# Patient Record
Sex: MALE | Race: WHITE | NOT HISPANIC OR LATINO | Employment: OTHER | URBAN - NONMETROPOLITAN AREA
[De-identification: names, ages, dates, MRNs, and addresses within clinical notes are randomized per-mention and may not be internally consistent; named-entity substitution may affect disease eponyms.]

---

## 2017-10-18 ENCOUNTER — TRANSCRIBE ORDERS (OUTPATIENT)
Dept: PODIATRY | Facility: CLINIC | Age: 52
End: 2017-10-18

## 2017-10-18 DIAGNOSIS — M79.673 PAIN OF FOOT, UNSPECIFIED LATERALITY: Primary | ICD-10-CM

## 2017-10-19 ENCOUNTER — OFFICE VISIT (OUTPATIENT)
Dept: PODIATRY | Facility: CLINIC | Age: 52
End: 2017-10-19

## 2017-10-19 VITALS
BODY MASS INDEX: 39.4 KG/M2 | HEIGHT: 68 IN | HEART RATE: 102 BPM | OXYGEN SATURATION: 96 % | SYSTOLIC BLOOD PRESSURE: 135 MMHG | WEIGHT: 260 LBS | DIASTOLIC BLOOD PRESSURE: 96 MMHG

## 2017-10-19 DIAGNOSIS — E11.8 DIABETIC FOOT (HCC): Primary | ICD-10-CM

## 2017-10-19 DIAGNOSIS — L85.1 ACQUIRED KERATODERMA: ICD-10-CM

## 2017-10-19 DIAGNOSIS — M79.672 PAIN IN BOTH FEET: ICD-10-CM

## 2017-10-19 DIAGNOSIS — M79.671 PAIN IN BOTH FEET: ICD-10-CM

## 2017-10-19 DIAGNOSIS — M19.079 ARTHRITIS OF MIDFOOT: ICD-10-CM

## 2017-10-19 PROCEDURE — 99203 OFFICE O/P NEW LOW 30 MIN: CPT | Performed by: PODIATRIST

## 2017-10-19 PROCEDURE — 11055 PARING/CUTG B9 HYPRKER LES 1: CPT | Performed by: PODIATRIST

## 2017-10-19 RX ORDER — OMEPRAZOLE 20 MG/1
20 CAPSULE, DELAYED RELEASE ORAL NIGHTLY
COMMUNITY

## 2017-10-19 RX ORDER — FOLIC ACID 1 MG/1
1 TABLET ORAL NIGHTLY
COMMUNITY

## 2017-10-19 RX ORDER — CHOLECALCIFEROL (VITAMIN D3) 125 MCG
CAPSULE ORAL NIGHTLY
COMMUNITY

## 2017-10-19 RX ORDER — INSULIN GLARGINE 100 [IU]/ML
90 INJECTION, SOLUTION SUBCUTANEOUS NIGHTLY
COMMUNITY

## 2017-10-19 RX ORDER — GLIPIZIDE 10 MG/1
10 TABLET ORAL NIGHTLY
COMMUNITY

## 2017-10-19 RX ORDER — ATORVASTATIN CALCIUM 40 MG/1
40 TABLET, FILM COATED ORAL NIGHTLY
COMMUNITY
End: 2022-11-06

## 2017-10-19 NOTE — PROGRESS NOTES
Rubén Maki  1965  52 y.o. male  PCP- Dr. House  BS-110 per patient  Patient presents today for diabetic foot exam.    10/19/2017  Chief Complaint   Patient presents with   • Right Foot - diabetic foot exam   • Left Foot - diabetic foot exam           History of Present Illness    Rubén Maki is a 52 y.o. male who presents for diabetic foot examination and complaint of chronic bilateral mild foot pain.  He states he does occasionally get pain on the tops of his feet and near his little toe on the bottom of his right foot.  He describes pain as achy and worse with weightbearing.  He takes glipizide for his diabetes.  He denies significant numbness, tingling and burning sensations.  He denies any acute pedal complaints today.      Past Medical History:   Diagnosis Date   • Diabetes mellitus    • Hypertension          Past Surgical History:   Procedure Laterality Date   • GALLBLADDER SURGERY     • GASTRIC BYPASS     • SHOULDER SURGERY     • SKIN GRAFT           Family History   Problem Relation Age of Onset   • Hypertension Mother    • Heart disease Father    • Hypertension Father    • Cancer Brother    • Cancer Paternal Aunt    • Diabetes Paternal Aunt    • Cancer Paternal Grandmother    • Heart disease Paternal Grandfather    • No Known Problems Sister    • No Known Problems Sister    • Autism Son          Social History     Social History   • Marital status:      Spouse name: N/A   • Number of children: N/A   • Years of education: N/A     Occupational History   • Not on file.     Social History Main Topics   • Smoking status: Never Smoker   • Smokeless tobacco: Former User     Quit date: 1/1/2000   • Alcohol use 3.6 oz/week     6 Cans of beer per week   • Drug use: No   • Sexual activity: Defer     Other Topics Concern   • Not on file     Social History Narrative   • No narrative on file         Current Outpatient Prescriptions   Medication Sig Dispense Refill   • atorvastatin (LIPITOR)  "40 MG tablet Take 40 mg by mouth Daily.     • Cholecalciferol (VITAMIN D3) 2000 units tablet Take  by mouth Daily.     • folic acid (FOLVITE) 1 MG tablet Take 1 mg by mouth Daily.     • glipiZIDE (GLUCOTROL) 5 MG tablet Take 5 mg by mouth Daily.     • insulin glargine (LANTUS) 100 UNIT/ML injection Inject 90 Units under the skin Daily.     • omeprazole (priLOSEC) 20 MG capsule Take 20 mg by mouth Daily.       No current facility-administered medications for this visit.          OBJECTIVE    /96  Pulse 102  Ht 68\" (172.7 cm)  Wt 260 lb (118 kg)  SpO2 96%  BMI 39.53 kg/m2      Review of Systems   Constitutional:  Denies recent weight loss, weight gain, fever or chills, no change in exercise tolerance  Musculoskeletal: Toe/foot pain.   Skin: No wounds or lesions  Neurological: Denies paresthesias.  Psychiatric/Behavioral: Denies depression        Physical Exam   Constitutional: he appears well-developed and well-nourished.   HEENT: Normocephalic. Atraumatic  CV: No tenderness. RRR  Resp: Non-labored respiration. No wheezes.   Psychiatric: he has a normal mood and affect. his   behavior is normal.      Lower Extremity Exam:  Vascular: DP/PT pulses palpable 1+.   Negative hair growth.   No edema  Toes warm, CFT wnl  Neuro: Protective sensation intact, b/l.  DTRs intact  Integument: No open wounds or lesions.  Keratosis sub-fourth MTPJ right  Skin quality normal  No masses  Musculoskeletal: LE muscle strength 5/5.   Gait normal  Mild degenerative changes to dorsal midfoot bilateral with palpable exostosis  Ankle range of motion within normal limits          ASSESSMENT AND PLAN    Rubén was seen today for diabetic foot exam and diabetic foot exam.    Diagnoses and all orders for this visit:    Diabetic foot    Acquired keratoderma    Arthritis of midfoot    Pain in both feet    -Comprehensive DM foot exam performed. Pt educated on importance of tight glucose control and daily foot checks. Pt education materials " dispensed.  -Pt educated on proper extra depth diabetic shoe gear.  Limit barefoot walking.  -Above noted hyperkeratosis debrided with 15 blade to epidermis without incident  -Follow up 6 months PRN            This document has been electronically signed by Suraj Nichols DPM on October 30, 2017 7:22 PM     EMR Dragon/Transcription disclaimer:   Much of this encounter note is an electronic transcription/translation of spoken language to printed text. The electronic translation of spoken language may permit erroneous, or at times, nonsensical words or phrases to be inadvertently transcribed; Although I have reviewed the note for such errors, some may still exist.    Suraj Nichols DPM  10/30/2017  7:22 PM

## 2018-08-06 ENCOUNTER — OFFICE VISIT (OUTPATIENT)
Dept: GASTROENTEROLOGY | Facility: CLINIC | Age: 53
End: 2018-08-06

## 2018-08-06 VITALS
DIASTOLIC BLOOD PRESSURE: 84 MMHG | WEIGHT: 245.2 LBS | HEIGHT: 68 IN | BODY MASS INDEX: 37.16 KG/M2 | HEART RATE: 110 BPM | SYSTOLIC BLOOD PRESSURE: 130 MMHG

## 2018-08-06 DIAGNOSIS — R74.8 ELEVATED LIVER ENZYMES: Primary | ICD-10-CM

## 2018-08-06 DIAGNOSIS — R17 ELEVATED BILIRUBIN: ICD-10-CM

## 2018-08-06 DIAGNOSIS — R10.84 GENERALIZED ABDOMINAL PAIN: ICD-10-CM

## 2018-08-06 PROCEDURE — 99203 OFFICE O/P NEW LOW 30 MIN: CPT | Performed by: PHYSICIAN ASSISTANT
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
## 2018-08-06 NOTE — PATIENT INSTRUCTIONS

## 2018-08-08 ENCOUNTER — APPOINTMENT (OUTPATIENT)
Dept: LAB | Facility: HOSPITAL | Age: 53
End: 2018-08-08

## 2018-08-08 LAB
ALBUMIN SERPL-MCNC: 4.4 G/DL (ref 3.4–4.8)
ALP SERPL-CCNC: 138 U/L (ref 38–126)
ALT SERPL W P-5'-P-CCNC: 50 U/L (ref 21–72)
AST SERPL-CCNC: 48 U/L (ref 17–59)
BILIRUB CONJ SERPL-MCNC: 0 MG/DL (ref 0–0.3)
BILIRUB INDIRECT SERPL-MCNC: 2 MG/DL (ref 0–1.1)
BILIRUB SERPL-MCNC: 2.8 MG/DL (ref 0.2–1.3)
FERRITIN SERPL-MCNC: 47.1 NG/ML (ref 17.9–464)
HAV IGM SERPL QL IA: NEGATIVE
HBV CORE IGM SERPL QL IA: NEGATIVE
HBV SURFACE AG SERPL QL IA: NEGATIVE
HCV AB SER DONR QL: NEGATIVE
INR PPP: 1.05 (ref 0.8–1.2)
IRON 24H UR-MRATE: 77 MCG/DL (ref 49–181)
IRON SATN MFR SERPL: 21 % (ref 20–55)
PROT SERPL-MCNC: 7.5 G/DL (ref 6.3–8.6)
PROTHROMBIN TIME: 13.5 SECONDS (ref 11.1–15.3)
TIBC SERPL-MCNC: 374 MCG/DL (ref 261–462)

## 2018-08-08 PROCEDURE — 82105 ALPHA-FETOPROTEIN SERUM: CPT | Performed by: PHYSICIAN ASSISTANT

## 2018-08-08 PROCEDURE — 80076 HEPATIC FUNCTION PANEL: CPT | Performed by: PHYSICIAN ASSISTANT

## 2018-08-08 PROCEDURE — 86038 ANTINUCLEAR ANTIBODIES: CPT | Performed by: PHYSICIAN ASSISTANT

## 2018-08-08 PROCEDURE — 84450 TRANSFERASE (AST) (SGOT): CPT | Performed by: PHYSICIAN ASSISTANT

## 2018-08-08 PROCEDURE — 82728 ASSAY OF FERRITIN: CPT | Performed by: PHYSICIAN ASSISTANT

## 2018-08-08 PROCEDURE — 84460 ALANINE AMINO (ALT) (SGPT): CPT | Performed by: PHYSICIAN ASSISTANT

## 2018-08-08 PROCEDURE — 82103 ALPHA-1-ANTITRYPSIN TOTAL: CPT | Performed by: PHYSICIAN ASSISTANT

## 2018-08-08 PROCEDURE — 85610 PROTHROMBIN TIME: CPT | Performed by: PHYSICIAN ASSISTANT

## 2018-08-08 PROCEDURE — 84478 ASSAY OF TRIGLYCERIDES: CPT | Performed by: PHYSICIAN ASSISTANT

## 2018-08-08 PROCEDURE — 82947 ASSAY GLUCOSE BLOOD QUANT: CPT | Performed by: PHYSICIAN ASSISTANT

## 2018-08-08 PROCEDURE — 83883 ASSAY NEPHELOMETRY NOT SPEC: CPT | Performed by: PHYSICIAN ASSISTANT

## 2018-08-08 PROCEDURE — 82977 ASSAY OF GGT: CPT | Performed by: PHYSICIAN ASSISTANT

## 2018-08-08 PROCEDURE — 83516 IMMUNOASSAY NONANTIBODY: CPT | Performed by: PHYSICIAN ASSISTANT

## 2018-08-08 PROCEDURE — 82390 ASSAY OF CERULOPLASMIN: CPT | Performed by: PHYSICIAN ASSISTANT

## 2018-08-08 PROCEDURE — 83550 IRON BINDING TEST: CPT | Performed by: PHYSICIAN ASSISTANT

## 2018-08-08 PROCEDURE — 83540 ASSAY OF IRON: CPT | Performed by: PHYSICIAN ASSISTANT

## 2018-08-08 PROCEDURE — 82172 ASSAY OF APOLIPOPROTEIN: CPT | Performed by: PHYSICIAN ASSISTANT

## 2018-08-08 PROCEDURE — 80074 ACUTE HEPATITIS PANEL: CPT | Performed by: PHYSICIAN ASSISTANT

## 2018-08-08 PROCEDURE — 83010 ASSAY OF HAPTOGLOBIN QUANT: CPT | Performed by: PHYSICIAN ASSISTANT

## 2018-08-08 PROCEDURE — 36415 COLL VENOUS BLD VENIPUNCTURE: CPT | Performed by: PHYSICIAN ASSISTANT

## 2018-08-08 PROCEDURE — 82465 ASSAY BLD/SERUM CHOLESTEROL: CPT | Performed by: PHYSICIAN ASSISTANT

## 2018-08-08 PROCEDURE — 82247 BILIRUBIN TOTAL: CPT | Performed by: PHYSICIAN ASSISTANT

## 2018-08-09 LAB
A1AT SERPL-MCNC: 120 MG/DL (ref 90–200)
ACTIN IGG SERPL-ACNC: 3 UNITS (ref 0–19)
AFP-TM SERPL-MCNC: 4.9 NG/ML (ref 0–8.3)
ANA SER QL IA: NEGATIVE
CERULOPLASMIN SERPL-MCNC: 26.3 MG/DL (ref 16–31)
DEPRECATED MITOCHONDRIA M2 IGG SER-ACNC: <20 UNITS (ref 0–20)
HAPTOGLOB SERPL-MCNC: 121 MG/DL (ref 34–200)

## 2018-08-11 LAB
A2 MACROGLOB SERPL-MCNC: 280 MG/DL (ref 110–276)
ALT SERPL W P-5'-P-CCNC: 38 IU/L (ref 0–55)
APO A-I SERPL-MCNC: 144 MG/DL (ref 101–178)
AST SERPL W P-5'-P-CCNC: 35 IU/L (ref 0–40)
BILIRUB SERPL-MCNC: 2.1 MG/DL (ref 0–1.2)
CHOLEST SERPL-MCNC: 111 MG/DL (ref 100–199)
FIBROSIS SCORING:: ABNORMAL
FIBROSIS STAGE SERPL QL: ABNORMAL
GGT SERPL-CCNC: 45 IU/L (ref 0–65)
GLUCOSE SERPL-MCNC: 381 MG/DL (ref 65–99)
HAPTOGLOB SERPL-MCNC: 126 MG/DL (ref 34–200)
INTERPRETATIONS: (REFERENCE): ABNORMAL
LABORATORY COMMENT REPORT: ABNORMAL
LIMITATIONS: (REFERENCE): ABNORMAL
LIVER FIBR SCORE SERPL CALC.FIBROSURE: 0.68 (ref 0–0.21)
NASH GRADE (REFERENCE): ABNORMAL
NASH SCORE (REFERENCE): 0.75
NASH SCORING (REFERENCE): ABNORMAL
STEATOSIS GRADE (REFERENCE): ABNORMAL
STEATOSIS GRADING (REFERENCE): ABNORMAL
STEATOSIS SCORE (REFERENCE): 0.72 (ref 0–0.3)
TRIGL SERPL-MCNC: 233 MG/DL (ref 0–149)
WEIGHT: (REFERENCE): 245 LBS

## 2018-08-20 ENCOUNTER — OFFICE VISIT (OUTPATIENT)
Dept: GASTROENTEROLOGY | Facility: CLINIC | Age: 53
End: 2018-08-20

## 2018-08-20 VITALS
SYSTOLIC BLOOD PRESSURE: 132 MMHG | WEIGHT: 241.4 LBS | DIASTOLIC BLOOD PRESSURE: 84 MMHG | HEIGHT: 68 IN | BODY MASS INDEX: 36.59 KG/M2 | HEART RATE: 88 BPM

## 2018-08-20 DIAGNOSIS — R17 ELEVATED BILIRUBIN: ICD-10-CM

## 2018-08-20 DIAGNOSIS — R74.8 ELEVATED LIVER ENZYMES: Primary | ICD-10-CM

## 2018-08-20 DIAGNOSIS — K75.81 NASH (NONALCOHOLIC STEATOHEPATITIS): ICD-10-CM

## 2018-08-20 DIAGNOSIS — K21.00 GASTROESOPHAGEAL REFLUX DISEASE WITH ESOPHAGITIS: ICD-10-CM

## 2018-08-20 PROCEDURE — 99213 OFFICE O/P EST LOW 20 MIN: CPT | Performed by: PHYSICIAN ASSISTANT

## 2018-08-20 NOTE — PROGRESS NOTES
Chief Complaint   Patient presents with   • Elevated Hepatic Enzymes   • elevated bilirubin       ENDO PROCEDURE ORDERED:    Subjective    Rubén Maki is a 52 y.o. male. he is here today for follow-up.    History of Present Illness    Patient seen on a recheck of his elevated liver enzymes, elevated bilirubin, gallstones. Last seen 08/06/2018. Patient currently denies abdominal pain, he is on Prilosec 20 mg daily for chronic heartburn, denied nausea, vomiting, dysphagia, bowels are moving without blood or mucus. Weight is down 4 pounds since last visit. [facility] had [facility] but has never had a colonoscopy.    Studies done on 08/08/2018 LFT's showed an elevated alkaline phosphatase 138 with a total bilirubin 2.8, mostly indirect of 2.0. INR 1.05. Haptoglobin was normal, hepatic diagnostic panel was negative. RONDA, AMA, SMA, ceruloplasmin, Alpha-1 antitrypsin, AFP, iron studies were normal or negative. LINDSEY FibroSure 0.68/F3, steatosis 0.72/S3, 0.75/N2. Total bilirubin 2.1, glucose 381, triglycerides 233.     A/P: Patient's GERD is well controlled on the Prilosec. He appears to be asymptomatic of his gallstones at present. Significant steatohepatitis with fibrosis. Elevated bilirubin presumed secondary to the above. I did recommend aggressive treatment of his diabetes and cholesterol. I strongly encouraged continued dietary modification and weight loss. He is encouraged to follow-up with Dr. House to try to get more tight control of his diabetes. Would recommend repeat imaging of his liver in June. Will plan follow-up in 4 months with repeat laboratories prior to that. Further pending clinical course and the results of the above.                    The following portions of the patient's history were reviewed and updated as appropriate:   Past Medical History:   Diagnosis Date   • Diabetes mellitus (CMS/HCC)    • Gall stones    • Hypertension      Past Surgical  "History:   Procedure Laterality Date   • GALLBLADDER SURGERY     • GASTRIC BYPASS     • SHOULDER SURGERY     • SKIN GRAFT       Family History   Problem Relation Age of Onset   • Hypertension Mother    • Heart disease Father    • Hypertension Father    • Cancer Brother    • Cancer Paternal Aunt    • Diabetes Paternal Aunt    • Cancer Paternal Grandmother    • Heart disease Paternal Grandfather    • No Known Problems Sister    • No Known Problems Sister    • Autism Son        No Known Allergies  Social History     Social History   • Marital status:      Social History Main Topics   • Smoking status: Never Smoker   • Smokeless tobacco: Former User     Quit date: 1/1/2000   • Alcohol use 3.6 oz/week     6 Cans of beer per week   • Drug use: No   • Sexual activity: Defer     Other Topics Concern   • Not on file       Current Outpatient Prescriptions:   •  atorvastatin (LIPITOR) 40 MG tablet, Take 40 mg by mouth Daily., Disp: , Rfl:   •  Cholecalciferol (VITAMIN D3) 2000 units tablet, Take  by mouth Daily., Disp: , Rfl:   •  folic acid (FOLVITE) 1 MG tablet, Take 1 mg by mouth Daily., Disp: , Rfl:   •  glipiZIDE (GLUCOTROL) 5 MG tablet, Take 5 mg by mouth Daily., Disp: , Rfl:   •  insulin glargine (LANTUS) 100 UNIT/ML injection, Inject 90 Units under the skin Daily., Disp: , Rfl:   •  omeprazole (priLOSEC) 20 MG capsule, Take 20 mg by mouth Daily., Disp: , Rfl:   Review of Systems  Review of Systems       Objective    /84 (BP Location: Right arm, Patient Position: Sitting)   Pulse 88   Ht 172.7 cm (68\")   Wt 109 kg (241 lb 6.4 oz)   BMI 36.70 kg/m²   Physical Exam   Constitutional: He is oriented to person, place, and time. He appears well-developed and well-nourished. No distress.   HENT:   Head: Normocephalic and atraumatic.   Eyes: Pupils are equal, round, and reactive to light. EOM are normal.   Neck: Normal range of motion.   Cardiovascular: Normal rate, regular rhythm and normal heart sounds.  "   Pulmonary/Chest: Effort normal and breath sounds normal.   Abdominal: Soft. Bowel sounds are normal. He exhibits no shifting dullness, no distension, no abdominal bruit, no ascites and no mass. There is no hepatosplenomegaly. There is tenderness. There is no rigidity, no rebound, no guarding and no CVA tenderness. No hernia. Hernia confirmed negative in the ventral area.   Mild, obese, diffuse   Musculoskeletal: Normal range of motion.   Neurological: He is alert and oriented to person, place, and time.   Skin: Skin is warm and dry.   Psychiatric: He has a normal mood and affect. His behavior is normal. Judgment and thought content normal.   Nursing note and vitals reviewed.    Assessment/Plan      1. Elevated liver enzymes    2. LINDSEY (nonalcoholic steatohepatitis)    3. Elevated bilirubin    4. Gastroesophageal reflux disease with esophagitis    .   Rubén was seen today for elevated hepatic enzymes and elevated bilirubin.    Diagnoses and all orders for this visit:    Elevated liver enzymes  -     Comprehensive Metabolic Panel; Future  -     Protime-INR; Future    LINDSEY (nonalcoholic steatohepatitis)  Comments:  F3/S3/N2  Orders:  -     Comprehensive Metabolic Panel; Future  -     Protime-INR; Future    Elevated bilirubin  -     Comprehensive Metabolic Panel; Future  -     Protime-INR; Future    Gastroesophageal reflux disease with esophagitis  -     Comprehensive Metabolic Panel; Future  -     Protime-INR; Future        Orders placed during this encounter include:  Orders Placed This Encounter   Procedures   • Comprehensive Metabolic Panel     Standing Status:   Future     Standing Expiration Date:   2/16/2019   • Protime-INR     Standing Status:   Future     Standing Expiration Date:   2/16/2019       Medications prescribed:  No orders of the defined types were placed in this encounter.      Requested Prescriptions      No prescriptions requested or ordered in this encounter       Review and/or summary of lab  tests, radiology, procedures, medications. Review and summary of old records and obtaining of history. The risks and benefits of my recommendations, as well as other treatment options were discussed with the patient today. Questions were answered.    Follow-up: Return in about 4 months (around 12/20/2018), or if symptoms worsen or fail to improve, for lab prior.     * Surgery not found *      This document has been electronically signed by Ivan Nichols PA-C on August 21, 2018 7:09 PM      Results for orders placed or performed in visit on 08/06/18   LINDSEY Fibrosure   Result Value Ref Range    Fibrosis Score (References) 0.68 (H) 0.00 - 0.21    Fibrosis Stage (Reference) Comment     Steatosis Score (Reference) 0.72 (H) 0.00 - 0.30    Steatosis Grade (Reference) Comment     LINDSEY Score (Reference) 0.75 0.25    Lindsey Grade (Reference) Comment     Height: (Reference) 68 in    Weight: (Reference) 245 LBS    Alpha 2-Macroglobulins, Qn 280 (H) 110 - 276 mg/dL    Haptoglobin 126 34 - 200 mg/dL    Apolipoprotein A-1 144 101 - 178 mg/dL    Total Bilirubin 2.1 (H) 0.0 - 1.2 mg/dL    GGT 45 0 - 65 IU/L    ALT (SGPT) 38 0 - 55 IU/L    AST (SGOT) P5P (Reference) 35 0 - 40 IU/L    Cholesterol, Total (Reference) 111 100 - 199 mg/dL    Glucose, Serum (Reference) 381 (H) 65 - 99 mg/dL    Triglycerides 233 (H) 0 - 149 mg/dL    Interpretations: (Reference) Comment     Fibrosis Scoring: Comment     Steatosis Grading (Reference) Comment     Lindsey Scoring (Reference) Comment     Limitations: (Reference) Comment     Comment (Reference) Comment    Nuclear Antigen Antibody, IFA   Result Value Ref Range    RONDA Negative    Iron and TIBC   Result Value Ref Range    Iron 77 49 - 181 mcg/dL    TIBC 374 261 - 462 mcg/dL    Iron Saturation 21 20 - 55 %   Alpha - 1 - Antitrypsin   Result Value Ref Range    A-1 Antitrypsin 120 90 - 200 mg/dL   Mitochondrial Antibodies, M2   Result Value Ref Range    Mitochondrial Ab <20.0 0.0 - 20.0 Units    Ceruloplasmin   Result Value Ref Range    Ceruloplasmin 26.3 16.0 - 31.0 mg/dL   AFP Tumor Marker   Result Value Ref Range    AFP Tumor Marker 4.9 0.0 - 8.3 ng/mL   Hepatitis Panel, Acute   Result Value Ref Range    Hepatitis C Ab Negative Negative    Hep A IgM Negative Negative    Hep B C IgM Negative Negative    Hepatitis B Surface Ag Negative Negative   Anti-Smooth Muscle Antibody Titer   Result Value Ref Range    Smooth Muscle Ab 3 0 - 19 Units   Protime-INR   Result Value Ref Range    Protime 13.5 11.1 - 15.3 Seconds    INR 1.05 0.80 - 1.20   Haptoglobin   Result Value Ref Range    Haptoglobin 121 34 - 200 mg/dL   Ferritin   Result Value Ref Range    Ferritin 47.10 17.90 - 464.00 ng/mL   Hepatic Function Panel   Result Value Ref Range    Total Protein 7.5 6.3 - 8.6 g/dL    Albumin 4.40 3.40 - 4.80 g/dL    ALT (SGPT) 50 21 - 72 U/L    AST (SGOT) 48 17 - 59 U/L    Alkaline Phosphatase 138 (H) 38 - 126 U/L    Total Bilirubin 2.8 (H) 0.2 - 1.3 mg/dL    Bilirubin, Direct 0.0 0.0 - 0.3 mg/dL    Bilirubin, Indirect 2.0 (H) 0.0 - 1.1 mg/dL   Results for orders placed or performed during the hospital encounter of 08/13/15   Manual Differential   Result Value Ref Range    Neutrophil Rel % 66 37 - 80 %    Lymphocyte Rel % 21 10 - 50 %    Monocyte Rel % 5 0 - 12 %    Eosinophil Rel % 3 0 - 7 %    Basophil Rel % 2 0 - 2 %    Atypical Lymphocytes Rel % 3 (H) 0 - 0 %    RBC Morphology Normocytic Normochromic     Platelet Estimate Decreased     Total Counted 100    CBC and Differential   Result Value Ref Range    WBC 9.1 3.2 - 9.8 x1000/uL    RBC 4.93 4.37 - 5.74 liane/mm3    Hemoglobin 15.8 13.7 - 17.3 gm/dl    Hematocrit 43.1 39.0 - 49.0 %    MCV 87.4 80.0 - 98.0 fl    MCH 32.0 26.0 - 34.0 pg    MCHC 36.7 (H) 31.5 - 36.3 gm/dl    RDW 12.5 11.5 - 14.5 %    Platelets 66 (L) 150 - 450 x1000/mm3    MPV 12.5 (H) 8.0 - 12.0 fl    Neutrophil Rel % 60.4 37.0 - 80.0 %    Lymphocyte Rel % 28.8 10.0 - 50.0 %    Monocyte Rel % 7.8 0.0  - 12.0 %    Eosinophil Rel % 1.9 0.0 - 7.0 %    Basophil Rel % 0.8 0.0 - 2.0 %    Immature Granulocyte Rel % 0.30 0.00 - 0.50 %    Neutrophils Absolute 5.51 2.00 - 8.60 x1000/uL    Lymphocytes Absolute 2.62 0.60 - 4.20 x1000/uL    Monocytes Absolute 0.71 0.00 - 0.90 x1000/uL    Eosinophils Absolute 0.17 0.00 - 0.70 x1000/uL    Basophils Absolute 0.07 0.00 - 0.20 x1000/uL    Immature Granulocytes Absolute 0.030 (H) 0.005 - 0.022 x1000/uL    nRBC 0.0 0.0 - 0.2 %    nRBC 0.000 x1000/uL   Results for orders placed or performed during the hospital encounter of 02/01/15   Manual Differential   Result Value Ref Range    Neutrophil Rel % 76 37 - 80 %    Lymphocyte Rel % 11 10 - 50 %    Monocyte Rel % 6 0 - 12 %    Eosinophil Rel % 2 0 - 7 %    Atypical Lymphocytes Rel % 5 (H) 0 - 0 %    RBC Morphology Normocytic Normochromic     Platelet Estimate Decreased     Total Counted 100    CBC and Differential   Result Value Ref Range    WBC 12.6 (H) 3.2 - 9.8 x1000/uL    RBC 4.96 4.37 - 5.74 liane/mm3    Hemoglobin 15.9 13.7 - 17.3 gm/dl    Hematocrit 43.0 39.0 - 49.0 %    MCV 86.7 80.0 - 98.0 fl    MCH 32.1 26.0 - 34.0 pg    MCHC 37.0 (H) 31.5 - 36.3 gm/dl    RDW 12.5 11.5 - 14.5 %    Platelets 103 (L) 150 - 450 x1000/mm3    MPV 13.6 (H) 8.0 - 12.0 fl    Neutrophil Rel % 76.3 37.0 - 80.0 %    Lymphocyte Rel % 16.0 10.0 - 50.0 %    Monocyte Rel % 6.2 0.0 - 12.0 %    Eosinophil Rel % 0.8 0.0 - 7.0 %    Basophil Rel % 0.4 0.0 - 2.0 %    Immature Granulocyte Rel % 0.30 0.00 - 0.50 %    Neutrophils Absolute 9.65 (H) 2.00 - 8.60 x1000/uL    Lymphocytes Absolute 2.02 0.60 - 4.20 x1000/uL    Monocytes Absolute 0.78 0.00 - 0.90 x1000/uL    Eosinophils Absolute 0.10 0.00 - 0.70 x1000/uL    Basophils Absolute 0.05 0.00 - 0.20 x1000/uL    Immature Granulocytes Absolute 0.040 (H) 0.005 - 0.022 x1000/uL    nRBC 0.0 0.0 - 0.2 %    nRBC 0.000 x1000/uL     *Note: Due to a large number of results and/or encounters for the requested time period, some  results have not been displayed. A complete set of results can be found in Results Review.       Some portions of this note have been dictated using voice recognition software and may contain errors and/or omissions.

## 2018-12-19 ENCOUNTER — LAB (OUTPATIENT)
Dept: LAB | Facility: HOSPITAL | Age: 53
End: 2018-12-19

## 2018-12-19 DIAGNOSIS — R74.8 ELEVATED LIVER ENZYMES: ICD-10-CM

## 2018-12-19 DIAGNOSIS — R17 ELEVATED BILIRUBIN: ICD-10-CM

## 2018-12-19 DIAGNOSIS — K75.81 NASH (NONALCOHOLIC STEATOHEPATITIS): ICD-10-CM

## 2018-12-19 DIAGNOSIS — K21.00 GASTROESOPHAGEAL REFLUX DISEASE WITH ESOPHAGITIS: ICD-10-CM

## 2018-12-19 LAB
ALBUMIN SERPL-MCNC: 4.4 G/DL (ref 3.4–4.8)
ALBUMIN/GLOB SERPL: 1.4 G/DL (ref 1.1–1.8)
ALP SERPL-CCNC: 90 U/L (ref 38–126)
ALT SERPL W P-5'-P-CCNC: 42 U/L (ref 21–72)
ANION GAP SERPL CALCULATED.3IONS-SCNC: 13 MMOL/L (ref 5–15)
AST SERPL-CCNC: 45 U/L (ref 17–59)
BILIRUB SERPL-MCNC: 1.5 MG/DL (ref 0.2–1.3)
BUN BLD-MCNC: 18 MG/DL (ref 7–21)
BUN/CREAT SERPL: 16.1 (ref 7–25)
CALCIUM SPEC-SCNC: 9.4 MG/DL (ref 8.4–10.2)
CHLORIDE SERPL-SCNC: 100 MMOL/L (ref 95–110)
CO2 SERPL-SCNC: 27 MMOL/L (ref 22–31)
CREAT BLD-MCNC: 1.12 MG/DL (ref 0.7–1.3)
GFR SERPL CREATININE-BSD FRML MDRD: 69 ML/MIN/1.73 (ref 56–130)
GLOBULIN UR ELPH-MCNC: 3.1 GM/DL (ref 2.3–3.5)
GLUCOSE BLD-MCNC: 166 MG/DL (ref 60–100)
INR PPP: 1.03 (ref 0.8–1.2)
POTASSIUM BLD-SCNC: 4.3 MMOL/L (ref 3.5–5.1)
PROT SERPL-MCNC: 7.5 G/DL (ref 6.3–8.6)
PROTHROMBIN TIME: 13.3 SECONDS (ref 11.1–15.3)
SODIUM BLD-SCNC: 140 MMOL/L (ref 137–145)

## 2018-12-19 PROCEDURE — 80053 COMPREHEN METABOLIC PANEL: CPT

## 2018-12-19 PROCEDURE — 85610 PROTHROMBIN TIME: CPT

## 2018-12-19 PROCEDURE — 36415 COLL VENOUS BLD VENIPUNCTURE: CPT

## 2018-12-20 ENCOUNTER — OFFICE VISIT (OUTPATIENT)
Dept: GASTROENTEROLOGY | Facility: CLINIC | Age: 53
End: 2018-12-20

## 2018-12-20 VITALS
BODY MASS INDEX: 39.98 KG/M2 | HEIGHT: 68 IN | DIASTOLIC BLOOD PRESSURE: 86 MMHG | WEIGHT: 263.8 LBS | HEART RATE: 96 BPM | SYSTOLIC BLOOD PRESSURE: 126 MMHG

## 2018-12-20 DIAGNOSIS — K75.81 NASH (NONALCOHOLIC STEATOHEPATITIS): Primary | ICD-10-CM

## 2018-12-20 DIAGNOSIS — K21.00 GASTROESOPHAGEAL REFLUX DISEASE WITH ESOPHAGITIS: ICD-10-CM

## 2018-12-20 DIAGNOSIS — R74.8 ELEVATED LIVER ENZYMES: ICD-10-CM

## 2018-12-20 PROCEDURE — 99213 OFFICE O/P EST LOW 20 MIN: CPT | Performed by: PHYSICIAN ASSISTANT

## 2018-12-20 NOTE — PROGRESS NOTES
Chief Complaint   Patient presents with   • Elevated Hepatic Enzymes   • Lindsey   • Elevated Bilirubin       ENDO PROCEDURE ORDERED:    Subjective    Rubén Maki is a 53 y.o. male. he is here today for follow-up.    History of Present Illness    Patient seen on a recheck of his GERD, elevated liver enzymes, LINDSEY, gallstones, F3/S3/N2. Last seen 08/20/2018. Patient is currently on Prilosec 20 mg daily for chronic heartburn. Denied nausea, vomiting, dysphasia. Bowels are moving without blood or mucus. Weight is up 22 pounds since last visit. He states he has been much less active. 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 He has never had a colonoscopy but [facility]    Laboratory on 12/19/2018: INR 1.03. CMP showed glucose 166, total bilirubin 1.5, otherwise normal.    ASSESSMENT AND PLAN: Patient with significant steatohepatitis and hepatic fibrosis secondary to a fatty liver, secondary to his diabetes. He is encouraged to continue dietary modification and significant weight loss. I encouraged him to be more active. 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 He does not appear symptomatic of his gallstones at this time. Would consider colonoscopy, consider hepatoma screening. Will plan followup in 6 months with LINDSEY FibroSure prior, further pending clinical course and the results of the above.       The following portions of the patient's history were reviewed and updated as appropriate:   Past Medical History:   Diagnosis Date   • Diabetes mellitus (CMS/HCC)    • Gall stones    • Hypertension      Past Surgical History:   Procedure Laterality Date   • GALLBLADDER SURGERY     • GASTRIC BYPASS     • SHOULDER SURGERY     • SKIN GRAFT       Family History   Problem Relation Age of Onset   • Hypertension Mother    • Heart disease Father    • Hypertension Father    • Cancer  "Brother    • Cancer Paternal Aunt    • Diabetes Paternal Aunt    • Cancer Paternal Grandmother    • Heart disease Paternal Grandfather    • No Known Problems Sister    • No Known Problems Sister    • Autism Son        No Known Allergies  Social History     Socioeconomic History   • Marital status:      Spouse name: Not on file   • Number of children: Not on file   • Years of education: Not on file   • Highest education level: Not on file   Tobacco Use   • Smoking status: Never Smoker   • Smokeless tobacco: Former User   Substance and Sexual Activity   • Alcohol use: Yes     Alcohol/week: 3.6 oz     Types: 6 Cans of beer per week   • Drug use: No   • Sexual activity: Defer       Current Outpatient Medications:   •  atorvastatin (LIPITOR) 40 MG tablet, Take 40 mg by mouth Daily., Disp: , Rfl:   •  Cholecalciferol (VITAMIN D3) 2000 units tablet, Take  by mouth Daily., Disp: , Rfl:   •  folic acid (FOLVITE) 1 MG tablet, Take 1 mg by mouth Daily., Disp: , Rfl:   •  glipiZIDE (GLUCOTROL) 5 MG tablet, Take 5 mg by mouth Daily., Disp: , Rfl:   •  insulin glargine (LANTUS) 100 UNIT/ML injection, Inject 90 Units under the skin Daily., Disp: , Rfl:   •  omeprazole (priLOSEC) 20 MG capsule, Take 20 mg by mouth Daily., Disp: , Rfl:   Review of Systems  Review of Systems       Objective    /86 (BP Location: Left arm)   Pulse 96   Ht 172.7 cm (68\")   Wt 120 kg (263 lb 12.8 oz)   BMI 40.11 kg/m²   Physical Exam   Constitutional: He is oriented to person, place, and time. He appears well-developed and well-nourished. No distress.   HENT:   Head: Normocephalic and atraumatic.   Eyes: EOM are normal. Pupils are equal, round, and reactive to light.   Neck: Normal range of motion.   Cardiovascular: Normal rate, regular rhythm and normal heart sounds.   Pulmonary/Chest: Effort normal and breath sounds normal.   Abdominal: Soft. Bowel sounds are normal. He exhibits no shifting dullness, no distension, no abdominal bruit, " no ascites and no mass. There is no hepatosplenomegaly. There is tenderness. There is no rigidity, no rebound, no guarding and no CVA tenderness. No hernia. Hernia confirmed negative in the ventral area.   Mild, obese, diffuse   Musculoskeletal: Normal range of motion.   Neurological: He is alert and oriented to person, place, and time.   Skin: Skin is warm and dry.   Psychiatric: He has a normal mood and affect. His behavior is normal. Judgment and thought content normal.   Nursing note and vitals reviewed.    Assessment/Plan      1. LINDSEY (nonalcoholic steatohepatitis)    2. Elevated liver enzymes    3. Gastroesophageal reflux disease with esophagitis    .   Rubén was seen today for elevated hepatic enzymes, lindsey and elevated bilirubin.    Diagnoses and all orders for this visit:    LINDSEY (nonalcoholic steatohepatitis)  -     LINDSEY Fibrosure; Future    Elevated liver enzymes  -     LINDSEY Fibrosure; Future    Gastroesophageal reflux disease with esophagitis  -     LINDSEY Fibrosure; Future        Orders placed during this encounter include:  Orders Placed This Encounter   Procedures   • LINDSEY Fibrosure     Standing Status:   Future     Standing Expiration Date:   12/20/2019       Medications prescribed:  No orders of the defined types were placed in this encounter.      Requested Prescriptions      No prescriptions requested or ordered in this encounter       Review and/or summary of lab tests, radiology, procedures, medications. Review and summary of old records and obtaining of history. The risks and benefits of my recommendations, as well as other treatment options were discussed with the patient today. Questions were answered.    Follow-up: Return in about 6 months (around 6/20/2019), or if symptoms worsen or fail to improve, for lab prior.     * Surgery not found *      This document has been electronically signed by Ivan Nichols PA-C on December 23, 2018 12:49 PM      Results for orders placed or performed in  visit on 12/19/18   Protime-INR   Result Value Ref Range    Protime 13.3 11.1 - 15.3 Seconds    INR 1.03 0.80 - 1.20   Comprehensive Metabolic Panel   Result Value Ref Range    Glucose 166 (H) 60 - 100 mg/dL    BUN 18 7 - 21 mg/dL    Creatinine 1.12 0.70 - 1.30 mg/dL    Sodium 140 137 - 145 mmol/L    Potassium 4.3 3.5 - 5.1 mmol/L    Chloride 100 95 - 110 mmol/L    CO2 27.0 22.0 - 31.0 mmol/L    Calcium 9.4 8.4 - 10.2 mg/dL    Total Protein 7.5 6.3 - 8.6 g/dL    Albumin 4.40 3.40 - 4.80 g/dL    ALT (SGPT) 42 21 - 72 U/L    AST (SGOT) 45 17 - 59 U/L    Alkaline Phosphatase 90 38 - 126 U/L    Total Bilirubin 1.5 (H) 0.2 - 1.3 mg/dL    eGFR Non  Amer 69 56 - 130 mL/min/1.73    Globulin 3.1 2.3 - 3.5 gm/dL    A/G Ratio 1.4 1.1 - 1.8 g/dL    BUN/Creatinine Ratio 16.1 7.0 - 25.0    Anion Gap 13.0 5.0 - 15.0 mmol/L   Results for orders placed or performed in visit on 08/06/18   LINDSEY Fibrosure   Result Value Ref Range    Fibrosis Score (References) 0.68 (H) 0.00 - 0.21    Fibrosis Stage (Reference) Comment     Steatosis Score (Reference) 0.72 (H) 0.00 - 0.30    Steatosis Grade (Reference) Comment     LINDSEY Score (Reference) 0.75 0.25    Lindsey Grade (Reference) Comment     Height: (Reference) 68 in    Weight: (Reference) 245 LBS    Alpha 2-Macroglobulins, Qn 280 (H) 110 - 276 mg/dL    Haptoglobin 126 34 - 200 mg/dL    Apolipoprotein A-1 144 101 - 178 mg/dL    Total Bilirubin 2.1 (H) 0.0 - 1.2 mg/dL    GGT 45 0 - 65 IU/L    ALT (SGPT) 38 0 - 55 IU/L    AST (SGOT) P5P (Reference) 35 0 - 40 IU/L    Cholesterol, Total (Reference) 111 100 - 199 mg/dL    Glucose, Serum (Reference) 381 (H) 65 - 99 mg/dL    Triglycerides 233 (H) 0 - 149 mg/dL    Interpretations: (Reference) Comment     Fibrosis Scoring: Comment     Steatosis Grading (Reference) Comment     Lindsey Scoring (Reference) Comment     Limitations: (Reference) Comment     Comment (Reference) Comment    Nuclear Antigen Antibody, IFA   Result Value Ref Range    RONDA  Negative    Iron and TIBC   Result Value Ref Range    Iron 77 49 - 181 mcg/dL    TIBC 374 261 - 462 mcg/dL    Iron Saturation 21 20 - 55 %   Alpha - 1 - Antitrypsin   Result Value Ref Range    A-1 Antitrypsin 120 90 - 200 mg/dL   Mitochondrial Antibodies, M2   Result Value Ref Range    Mitochondrial Ab <20.0 0.0 - 20.0 Units   Ceruloplasmin   Result Value Ref Range    Ceruloplasmin 26.3 16.0 - 31.0 mg/dL   AFP Tumor Marker   Result Value Ref Range    AFP Tumor Marker 4.9 0.0 - 8.3 ng/mL   Hepatitis Panel, Acute   Result Value Ref Range    Hepatitis C Ab Negative Negative    Hep A IgM Negative Negative    Hep B C IgM Negative Negative    Hepatitis B Surface Ag Negative Negative   Anti-Smooth Muscle Antibody Titer   Result Value Ref Range    Smooth Muscle Ab 3 0 - 19 Units   Protime-INR   Result Value Ref Range    Protime 13.5 11.1 - 15.3 Seconds    INR 1.05 0.80 - 1.20   Haptoglobin   Result Value Ref Range    Haptoglobin 121 34 - 200 mg/dL   Ferritin   Result Value Ref Range    Ferritin 47.10 17.90 - 464.00 ng/mL   Hepatic Function Panel   Result Value Ref Range    Total Protein 7.5 6.3 - 8.6 g/dL    Albumin 4.40 3.40 - 4.80 g/dL    ALT (SGPT) 50 21 - 72 U/L    AST (SGOT) 48 17 - 59 U/L    Alkaline Phosphatase 138 (H) 38 - 126 U/L    Total Bilirubin 2.8 (H) 0.2 - 1.3 mg/dL    Bilirubin, Direct 0.0 0.0 - 0.3 mg/dL    Bilirubin, Indirect 2.0 (H) 0.0 - 1.1 mg/dL   Results for orders placed or performed during the hospital encounter of 08/13/15   Manual Differential   Result Value Ref Range    Neutrophil Rel % 66 37 - 80 %    Lymphocyte Rel % 21 10 - 50 %    Monocyte Rel % 5 0 - 12 %    Eosinophil Rel % 3 0 - 7 %    Basophil Rel % 2 0 - 2 %    Atypical Lymphocytes Rel % 3 (H) 0 - 0 %    RBC Morphology Normocytic Normochromic     Platelet Estimate Decreased     Total Counted 100    CBC and Differential   Result Value Ref Range    WBC 9.1 3.2 - 9.8 x1000/uL    RBC 4.93 4.37 - 5.74 liane/mm3    Hemoglobin 15.8 13.7 - 17.3  gm/dl    Hematocrit 43.1 39.0 - 49.0 %    MCV 87.4 80.0 - 98.0 fl    MCH 32.0 26.0 - 34.0 pg    MCHC 36.7 (H) 31.5 - 36.3 gm/dl    RDW 12.5 11.5 - 14.5 %    Platelets 66 (L) 150 - 450 x1000/mm3    MPV 12.5 (H) 8.0 - 12.0 fl    Neutrophil Rel % 60.4 37.0 - 80.0 %    Lymphocyte Rel % 28.8 10.0 - 50.0 %    Monocyte Rel % 7.8 0.0 - 12.0 %    Eosinophil Rel % 1.9 0.0 - 7.0 %    Basophil Rel % 0.8 0.0 - 2.0 %    Immature Granulocyte Rel % 0.30 0.00 - 0.50 %    Neutrophils Absolute 5.51 2.00 - 8.60 x1000/uL    Lymphocytes Absolute 2.62 0.60 - 4.20 x1000/uL    Monocytes Absolute 0.71 0.00 - 0.90 x1000/uL    Eosinophils Absolute 0.17 0.00 - 0.70 x1000/uL    Basophils Absolute 0.07 0.00 - 0.20 x1000/uL    Immature Granulocytes Absolute 0.030 (H) 0.005 - 0.022 x1000/uL    nRBC 0.0 0.0 - 0.2 %    nRBC 0.000 x1000/uL     *Note: Due to a large number of results and/or encounters for the requested time period, some results have not been displayed. A complete set of results can be found in Results Review.       Some portions of this note have been dictated using voice recognition software and may contain errors and/or omissions.

## 2018-12-20 NOTE — PATIENT INSTRUCTIONS

## 2019-06-20 ENCOUNTER — OFFICE VISIT (OUTPATIENT)
Dept: GASTROENTEROLOGY | Facility: CLINIC | Age: 54
End: 2019-06-20

## 2019-06-20 VITALS
SYSTOLIC BLOOD PRESSURE: 122 MMHG | BODY MASS INDEX: 38.89 KG/M2 | WEIGHT: 256.6 LBS | DIASTOLIC BLOOD PRESSURE: 82 MMHG | HEART RATE: 88 BPM | HEIGHT: 68 IN

## 2019-06-20 DIAGNOSIS — K21.00 GASTROESOPHAGEAL REFLUX DISEASE WITH ESOPHAGITIS: ICD-10-CM

## 2019-06-20 DIAGNOSIS — K75.81 NASH (NONALCOHOLIC STEATOHEPATITIS): Primary | ICD-10-CM

## 2019-06-20 DIAGNOSIS — R74.8 ELEVATED LIVER ENZYMES: ICD-10-CM

## 2019-06-20 PROCEDURE — 99213 OFFICE O/P EST LOW 20 MIN: CPT | Performed by: PHYSICIAN ASSISTANT

## 2019-06-20 NOTE — PATIENT INSTRUCTIONS

## 2019-06-20 NOTE — PROGRESS NOTES
Chief Complaint   Patient presents with   • Lindsey   • Elevated Hepatic Enzymes   • Heartburn       ENDO PROCEDURE ORDERED:    Subjective    Rubén Maki is a 53 y.o. male. he is here today for follow-up.    History of Present Illness    SUBJECTIVE:  Patient was seen on a recheck of his GERD, elevated liver enzymes, gallstones, LINDSEY, S3/F4/N2.  Last seen 12/20/2018.  Patient did not get his LINDSEY Fibrosure before after this appointment.  [facility]  We did finally receive them after the patient left.  He had a negative FIT test on 06/04/2019.  Laboratories on 05/17/2019 showed normal renal function.  Glucose was 271.  A1c was 10%.  Hemoglobin and hematocrit were normal.  Cholesterol panel was okay.  LFTs showed a total bilirubin 2.2, otherwise normal.  TSH was normal.  Vitamin D was normal.  Platelet count was low at 53.    Patient states his GERD is doing well on the Prilosec 20 mg daily.  He denied nausea, vomiting, dysphagia.  Bowel movements are regular without blood or mucus.  Weight is down 7 pounds since last visit.  He has never had a colonoscopy.  Patient with elevated liver enzymes.  He does take Lipitor.  Cholesterol appears to be doing well.  His diabetes is poorly controlled and he was encouraged to follow up with Dr. House.  Encouraged to continue dietary modification with significant weight loss.  We will plan follow up in 4 months with LINDSEY Fibrosure, INR, AFP, right upper quadrant ultrasound prior, sooner if needed.  Further pending clinical course and the results of the above.       The following portions of the patient's history were reviewed and updated as appropriate:   Past Medical History:   Diagnosis Date   • Diabetes mellitus (CMS/HCC)    • Elevated liver enzymes    • Gall stones    • GERD (gastroesophageal reflux disease)    • Hypertension    • LINDSEY (nonalcoholic steatohepatitis)      Past Surgical History:   Procedure Laterality Date   • GALLBLADDER  "SURGERY     • GASTRIC BYPASS     • SHOULDER SURGERY     • SKIN GRAFT       Family History   Problem Relation Age of Onset   • Hypertension Mother    • Heart disease Father    • Hypertension Father    • Cancer Brother    • Cancer Paternal Aunt    • Diabetes Paternal Aunt    • Cancer Paternal Grandmother    • Heart disease Paternal Grandfather    • No Known Problems Sister    • No Known Problems Sister    • Autism Son        No Known Allergies  Social History     Socioeconomic History   • Marital status:      Spouse name: Not on file   • Number of children: Not on file   • Years of education: Not on file   • Highest education level: Not on file   Tobacco Use   • Smoking status: Never Smoker   • Smokeless tobacco: Former User   Substance and Sexual Activity   • Alcohol use: Yes     Alcohol/week: 3.6 oz     Types: 6 Cans of beer per week   • Drug use: No   • Sexual activity: Defer     Current Medications:  Prior to Admission medications    Medication Sig Start Date End Date Taking? Authorizing Provider   atorvastatin (LIPITOR) 40 MG tablet Take 40 mg by mouth Daily.   Yes Dmitriy Soto MD   Cholecalciferol (VITAMIN D3) 2000 units tablet Take  by mouth Daily.   Yes Dmitriy Soto MD   Cyanocobalamin 1000 MCG/ML kit Inject  as directed Every 30 (Thirty) Days.   Yes Dmitriy Soto MD   folic acid (FOLVITE) 1 MG tablet Take 1 mg by mouth Daily.   Yes Dmitriy Soto MD   glipiZIDE (GLUCOTROL) 5 MG tablet Take 5 mg by mouth Daily.   Yes Dmitriy Soto MD   insulin glargine (LANTUS) 100 UNIT/ML injection Inject 90 Units under the skin Daily.   Yes Dmitriy Soto MD   omeprazole (priLOSEC) 20 MG capsule Take 20 mg by mouth Daily.   Yes Dmitriy Soto MD     Review of Systems  Review of Systems       Objective    /82 (BP Location: Left arm)   Pulse 88   Ht 172.7 cm (68\")   Wt 116 kg (256 lb 9.6 oz)   BMI 39.02 kg/m²   Physical Exam   Constitutional: He is " oriented to person, place, and time. He appears well-developed and well-nourished. No distress.   HENT:   Head: Normocephalic and atraumatic.   Eyes: EOM are normal. Pupils are equal, round, and reactive to light.   Neck: Normal range of motion.   Cardiovascular: Normal rate, regular rhythm and normal heart sounds.   Pulmonary/Chest: Effort normal and breath sounds normal.   Abdominal: Soft. Bowel sounds are normal. He exhibits no shifting dullness, no distension, no abdominal bruit, no ascites and no mass. There is no hepatosplenomegaly. There is tenderness. There is no rigidity, no rebound, no guarding and no CVA tenderness. No hernia. Hernia confirmed negative in the ventral area.   Mild, obese, diffuse   Musculoskeletal: Normal range of motion.   Neurological: He is alert and oriented to person, place, and time.   Skin: Skin is warm and dry.   Psychiatric: He has a normal mood and affect. His behavior is normal. Judgment and thought content normal.   Nursing note and vitals reviewed.    Assessment/Plan      1. LINDSEY (nonalcoholic steatohepatitis)    2. Elevated liver enzymes    3. Gastroesophageal reflux disease with esophagitis    .   Rubén was seen today for lindsey, elevated hepatic enzymes and heartburn.    Diagnoses and all orders for this visit:    LINDSEY (nonalcoholic steatohepatitis)  -     LINDSEY Fibrosure; Future  -     Protime-INR; Future  -     AFP Tumor Marker; Future  -     US Liver; Future    Elevated liver enzymes  -     LINDSEY Fibrosure; Future  -     Protime-INR; Future  -     AFP Tumor Marker; Future  -     US Liver; Future    Gastroesophageal reflux disease with esophagitis        Orders placed during this encounter include:  Orders Placed This Encounter   Procedures   • US Liver     Due before follow in Oct     Standing Status:   Future     Standing Expiration Date:   10/31/2019     Scheduling Instructions:      RUQ     Order Specific Question:   Reason for Exam:     Answer:   fatty liver, LINDSEY      Order Specific Question:   Will this be performed with Elastography? (POLO and ES ONLY)     Answer:   No   • LINDSEY Fibrosure     Due before follow in Oct     Standing Status:   Future     Standing Expiration Date:   10/31/2019   • Protime-INR     Due before follow in Oct     Standing Status:   Future     Standing Expiration Date:   10/31/2019   • AFP Tumor Marker     Due before follow in Oct     Standing Status:   Future     Standing Expiration Date:   10/31/2019       Medications prescribed:  No orders of the defined types were placed in this encounter.      Requested Prescriptions      No prescriptions requested or ordered in this encounter       Review and/or summary of lab tests, radiology, procedures, medications. Review and summary of old records and obtaining of history. The risks and benefits of my recommendations, as well as other treatment options were discussed with the patient today. Questions were answered.    Follow-up: Return in about 4 months (around 10/20/2019), or if symptoms worsen or fail to improve, for lab /US prior.     * Surgery not found *      This document has been electronically signed by Ivan Nichols PA-C on June 23, 2019 12:37 PM      Results for orders placed or performed in visit on 12/19/18   Protime-INR   Result Value Ref Range    Protime 13.3 11.1 - 15.3 Seconds    INR 1.03 0.80 - 1.20   Comprehensive Metabolic Panel   Result Value Ref Range    Glucose 166 (H) 60 - 100 mg/dL    BUN 18 7 - 21 mg/dL    Creatinine 1.12 0.70 - 1.30 mg/dL    Sodium 140 137 - 145 mmol/L    Potassium 4.3 3.5 - 5.1 mmol/L    Chloride 100 95 - 110 mmol/L    CO2 27.0 22.0 - 31.0 mmol/L    Calcium 9.4 8.4 - 10.2 mg/dL    Total Protein 7.5 6.3 - 8.6 g/dL    Albumin 4.40 3.40 - 4.80 g/dL    ALT (SGPT) 42 21 - 72 U/L    AST (SGOT) 45 17 - 59 U/L    Alkaline Phosphatase 90 38 - 126 U/L    Total Bilirubin 1.5 (H) 0.2 - 1.3 mg/dL    eGFR Non  Amer 69 56 - 130 mL/min/1.73    Globulin 3.1 2.3 -  3.5 gm/dL    A/G Ratio 1.4 1.1 - 1.8 g/dL    BUN/Creatinine Ratio 16.1 7.0 - 25.0    Anion Gap 13.0 5.0 - 15.0 mmol/L   Results for orders placed or performed in visit on 08/06/18   LINDSEY Fibrosure   Result Value Ref Range    Fibrosis Score (References) 0.68 (H) 0.00 - 0.21    Fibrosis Stage (Reference) Comment     Steatosis Score (Reference) 0.72 (H) 0.00 - 0.30    Steatosis Grade (Reference) Comment     LINDSEY Score (Reference) 0.75 0.25    Lindsey Grade (Reference) Comment     Height: (Reference) 68 in    Weight: (Reference) 245 LBS    Alpha 2-Macroglobulins, Qn 280 (H) 110 - 276 mg/dL    Haptoglobin 126 34 - 200 mg/dL    Apolipoprotein A-1 144 101 - 178 mg/dL    Total Bilirubin 2.1 (H) 0.0 - 1.2 mg/dL    GGT 45 0 - 65 IU/L    ALT (SGPT) 38 0 - 55 IU/L    AST (SGOT) P5P (Reference) 35 0 - 40 IU/L    Cholesterol, Total (Reference) 111 100 - 199 mg/dL    Glucose, Serum (Reference) 381 (H) 65 - 99 mg/dL    Triglycerides 233 (H) 0 - 149 mg/dL    Interpretations: (Reference) Comment     Fibrosis Scoring: Comment     Steatosis Grading (Reference) Comment     Lindsey Scoring (Reference) Comment     Limitations: (Reference) Comment     Comment (Reference) Comment    Nuclear Antigen Antibody, IFA   Result Value Ref Range    RONDA Negative    Iron and TIBC   Result Value Ref Range    Iron 77 49 - 181 mcg/dL    TIBC 374 261 - 462 mcg/dL    Iron Saturation 21 20 - 55 %   Alpha - 1 - Antitrypsin   Result Value Ref Range    A-1 Antitrypsin 120 90 - 200 mg/dL   Mitochondrial Antibodies, M2   Result Value Ref Range    Mitochondrial Ab <20.0 0.0 - 20.0 Units   Ceruloplasmin   Result Value Ref Range    Ceruloplasmin 26.3 16.0 - 31.0 mg/dL   AFP Tumor Marker   Result Value Ref Range    AFP Tumor Marker 4.9 0.0 - 8.3 ng/mL   Hepatitis Panel, Acute   Result Value Ref Range    Hepatitis C Ab Negative Negative    Hep A IgM Negative Negative    Hep B C IgM Negative Negative    Hepatitis B Surface Ag Negative Negative   Anti-Smooth Muscle Antibody  Titer   Result Value Ref Range    Smooth Muscle Ab 3 0 - 19 Units   Protime-INR   Result Value Ref Range    Protime 13.5 11.1 - 15.3 Seconds    INR 1.05 0.80 - 1.20   Haptoglobin   Result Value Ref Range    Haptoglobin 121 34 - 200 mg/dL   Ferritin   Result Value Ref Range    Ferritin 47.10 17.90 - 464.00 ng/mL   Hepatic Function Panel   Result Value Ref Range    Total Protein 7.5 6.3 - 8.6 g/dL    Albumin 4.40 3.40 - 4.80 g/dL    ALT (SGPT) 50 21 - 72 U/L    AST (SGOT) 48 17 - 59 U/L    Alkaline Phosphatase 138 (H) 38 - 126 U/L    Total Bilirubin 2.8 (H) 0.2 - 1.3 mg/dL    Bilirubin, Direct 0.0 0.0 - 0.3 mg/dL    Bilirubin, Indirect 2.0 (H) 0.0 - 1.1 mg/dL   Results for orders placed or performed during the hospital encounter of 08/13/15   Manual Differential   Result Value Ref Range    Neutrophil Rel % 66 37 - 80 %    Lymphocyte Rel % 21 10 - 50 %    Monocyte Rel % 5 0 - 12 %    Eosinophil Rel % 3 0 - 7 %    Basophil Rel % 2 0 - 2 %    Atypical Lymphocytes Rel % 3 (H) 0 - 0 %    RBC Morphology Normocytic Normochromic     Platelet Estimate Decreased     Total Counted 100    CBC and Differential   Result Value Ref Range    WBC 9.1 3.2 - 9.8 x1000/uL    RBC 4.93 4.37 - 5.74 liane/mm3    Hemoglobin 15.8 13.7 - 17.3 gm/dl    Hematocrit 43.1 39.0 - 49.0 %    MCV 87.4 80.0 - 98.0 fl    MCH 32.0 26.0 - 34.0 pg    MCHC 36.7 (H) 31.5 - 36.3 gm/dl    RDW 12.5 11.5 - 14.5 %    Platelets 66 (L) 150 - 450 x1000/mm3    MPV 12.5 (H) 8.0 - 12.0 fl    Neutrophil Rel % 60.4 37.0 - 80.0 %    Lymphocyte Rel % 28.8 10.0 - 50.0 %    Monocyte Rel % 7.8 0.0 - 12.0 %    Eosinophil Rel % 1.9 0.0 - 7.0 %    Basophil Rel % 0.8 0.0 - 2.0 %    Immature Granulocyte Rel % 0.30 0.00 - 0.50 %    Neutrophils Absolute 5.51 2.00 - 8.60 x1000/uL    Lymphocytes Absolute 2.62 0.60 - 4.20 x1000/uL    Monocytes Absolute 0.71 0.00 - 0.90 x1000/uL    Eosinophils Absolute 0.17 0.00 - 0.70 x1000/uL    Basophils Absolute 0.07 0.00 - 0.20 x1000/uL    Immature  Granulocytes Absolute 0.030 (H) 0.005 - 0.022 x1000/uL    nRBC 0.0 0.0 - 0.2 %    nRBC 0.000 x1000/uL     *Note: Due to a large number of results and/or encounters for the requested time period, some results have not been displayed. A complete set of results can be found in Results Review.       Some portions of this note have been dictated using voice recognition software and may contain errors and/or omissions.

## 2019-08-28 ENCOUNTER — APPOINTMENT (OUTPATIENT)
Dept: MRI IMAGING | Facility: HOSPITAL | Age: 54
End: 2019-08-28

## 2019-09-03 ENCOUNTER — HOSPITAL ENCOUNTER (OUTPATIENT)
Dept: MRI IMAGING | Facility: HOSPITAL | Age: 54
Discharge: HOME | End: 2019-09-03
Admitting: NURSE PRACTITIONER

## 2019-09-03 DIAGNOSIS — D69.6 THROMBOCYTOPENIA, UNSPECIFIED (HCC): ICD-10-CM

## 2019-09-03 PROCEDURE — 25010000002 GADOTERIDOL PER 1 ML: Performed by: NURSE PRACTITIONER

## 2019-09-03 PROCEDURE — 70553 MRI BRAIN STEM W/O & W/DYE: CPT

## 2019-09-03 PROCEDURE — A9576 INJ PROHANCE MULTIPACK: HCPCS | Performed by: NURSE PRACTITIONER

## 2019-09-03 RX ADMIN — GADOTERIDOL 20 ML: 279.3 INJECTION, SOLUTION INTRAVENOUS at 11:57

## 2019-10-14 ENCOUNTER — APPOINTMENT (OUTPATIENT)
Dept: ULTRASOUND IMAGING | Facility: HOSPITAL | Age: 54
End: 2019-10-14

## 2019-10-14 ENCOUNTER — TELEPHONE (OUTPATIENT)
Dept: GENERAL RADIOLOGY | Facility: HOSPITAL | Age: 54
End: 2019-10-14

## 2019-10-14 ENCOUNTER — LAB (OUTPATIENT)
Dept: LAB | Facility: HOSPITAL | Age: 54
End: 2019-10-14

## 2019-10-14 DIAGNOSIS — K75.81 NASH (NONALCOHOLIC STEATOHEPATITIS): ICD-10-CM

## 2019-10-14 DIAGNOSIS — R74.8 ELEVATED LIVER ENZYMES: ICD-10-CM

## 2019-10-14 LAB
ALPHA-FETOPROTEIN: 5.1 NG/ML (ref 0–8.3)
INR PPP: 0.98 (ref 0.8–1.2)
PROTHROMBIN TIME: 12.8 SECONDS (ref 11.1–15.3)

## 2019-10-14 PROCEDURE — 82977 ASSAY OF GGT: CPT

## 2019-10-14 PROCEDURE — 82247 BILIRUBIN TOTAL: CPT

## 2019-10-14 PROCEDURE — 83883 ASSAY NEPHELOMETRY NOT SPEC: CPT

## 2019-10-14 PROCEDURE — 84460 ALANINE AMINO (ALT) (SGPT): CPT

## 2019-10-14 PROCEDURE — 82947 ASSAY GLUCOSE BLOOD QUANT: CPT

## 2019-10-14 PROCEDURE — 84450 TRANSFERASE (AST) (SGOT): CPT

## 2019-10-14 PROCEDURE — 82105 ALPHA-FETOPROTEIN SERUM: CPT

## 2019-10-14 PROCEDURE — 82172 ASSAY OF APOLIPOPROTEIN: CPT

## 2019-10-14 PROCEDURE — 36415 COLL VENOUS BLD VENIPUNCTURE: CPT

## 2019-10-14 PROCEDURE — 84478 ASSAY OF TRIGLYCERIDES: CPT

## 2019-10-14 PROCEDURE — 85610 PROTHROMBIN TIME: CPT

## 2019-10-14 PROCEDURE — 82465 ASSAY BLD/SERUM CHOLESTEROL: CPT

## 2019-10-14 PROCEDURE — 83010 ASSAY OF HAPTOGLOBIN QUANT: CPT

## 2019-10-15 ENCOUNTER — TELEPHONE (OUTPATIENT)
Dept: GASTROENTEROLOGY | Facility: CLINIC | Age: 54
End: 2019-10-15

## 2019-10-15 NOTE — TELEPHONE ENCOUNTER
A voicemail has been left for the patient to make him aware that he was a no show for his ultrasound appointment on 10-. Encouraged the patient via voicemail to call the radiology department at [phone] to reschedule that appointment.

## 2019-10-15 NOTE — TELEPHONE ENCOUNTER
----- Message from Ivan Nichols PA-C sent at 10/14/2019  4:35 PM CDT -----  Patient had labs drawn but did not show for US

## 2019-10-16 ENCOUNTER — HOSPITAL ENCOUNTER (OUTPATIENT)
Dept: ULTRASOUND IMAGING | Facility: HOSPITAL | Age: 54
Discharge: HOME | End: 2019-10-16
Admitting: PHYSICIAN ASSISTANT

## 2019-10-16 DIAGNOSIS — K75.81 NASH (NONALCOHOLIC STEATOHEPATITIS): ICD-10-CM

## 2019-10-16 DIAGNOSIS — R74.8 ELEVATED LIVER ENZYMES: ICD-10-CM

## 2019-10-16 PROCEDURE — 76705 ECHO EXAM OF ABDOMEN: CPT

## 2019-10-17 LAB
A2 MACROGLOB SERPL-MCNC: 233 MG/DL (ref 110–276)
ALT SERPL W P-5'-P-CCNC: 27 IU/L (ref 0–55)
APO A-I SERPL-MCNC: 137 MG/DL (ref 101–178)
AST SERPL W P-5'-P-CCNC: 28 IU/L (ref 0–40)
BILIRUB SERPL-MCNC: 1.1 MG/DL (ref 0–1.2)
CHOLEST SERPL-MCNC: 105 MG/DL (ref 100–199)
FIBROSIS SCORING:: ABNORMAL
FIBROSIS STAGE SERPL QL: ABNORMAL
GGT SERPL-CCNC: 38 IU/L (ref 0–65)
GLUCOSE SERPL-MCNC: 144 MG/DL (ref 65–99)
HAPTOGLOB SERPL-MCNC: 95 MG/DL (ref 34–200)
INTERPRETATIONS: (REFERENCE): ABNORMAL
LABORATORY COMMENT REPORT: ABNORMAL
LIMITATIONS: (REFERENCE): ABNORMAL
LIVER FIBR SCORE SERPL CALC.FIBROSURE: 0.52 (ref 0–0.21)
NASH GRADE (REFERENCE): ABNORMAL
NASH SCORE (REFERENCE): 0.5
NASH SCORING (REFERENCE): ABNORMAL
STEATOSIS GRADE (REFERENCE): ABNORMAL
STEATOSIS GRADING (REFERENCE): ABNORMAL
STEATOSIS SCORE (REFERENCE): 0.54 (ref 0–0.3)
TRIGL SERPL-MCNC: 176 MG/DL (ref 0–149)
WEIGHT: (REFERENCE): 270 LBS

## 2019-10-21 ENCOUNTER — OFFICE VISIT (OUTPATIENT)
Dept: GASTROENTEROLOGY | Facility: CLINIC | Age: 54
End: 2019-10-21

## 2019-10-21 VITALS
HEART RATE: 99 BPM | HEIGHT: 68 IN | BODY MASS INDEX: 40.35 KG/M2 | SYSTOLIC BLOOD PRESSURE: 126 MMHG | WEIGHT: 266.2 LBS | DIASTOLIC BLOOD PRESSURE: 82 MMHG

## 2019-10-21 DIAGNOSIS — K75.81 NASH (NONALCOHOLIC STEATOHEPATITIS): Primary | ICD-10-CM

## 2019-10-21 DIAGNOSIS — R74.8 ELEVATED LIVER ENZYMES: ICD-10-CM

## 2019-10-21 DIAGNOSIS — K21.00 GASTROESOPHAGEAL REFLUX DISEASE WITH ESOPHAGITIS: ICD-10-CM

## 2019-10-21 PROCEDURE — 99213 OFFICE O/P EST LOW 20 MIN: CPT | Performed by: PHYSICIAN ASSISTANT

## 2019-10-21 NOTE — PATIENT INSTRUCTIONS

## 2019-10-21 NOTE — PROGRESS NOTES
Chief Complaint   Patient presents with   • Lindsey   • Heartburn   • Elevated Hepatic Enzymes       ENDO PROCEDURE ORDERED:    Subjective    Rubén Maki is a 54 y.o. male. he is here today for follow-up.    History of Present Illness    The patient was seen on a recheck of his GERD, LINDSEY, elevated liver enzymes, known gallstone.  Last seen 06/20/2019, accompanied by his spouse.  He states [facility] in [facility] that was [facility].  He has never had a colonoscopy.  He currently denies abdominal pain.  GERD is doing well on Prilosec.  He denies nausea, vomiting, dysphagia.  Bowels are moving without blood or mucus.  Weight is up 9.5 pounds since last visit.    Laboratories on 10/14/2019:  AFP, INR were normal.  LINDSEY Fibrosure 0.52/F2, steatosis 0.54/S1-S2, 0.50/N1.  Glucose 144, triglycerides 176, otherwise normal.  This has improved from previously.    Liver ultrasound from 10/16/2019 showed a fatty liver, post cholecystectomy with 4.7 mm common bile duct.    ASSESSMENT/PLAN:  Patient with chronic GERD, doing well on the Prilosec.  Steatohepatitis with fatty liver, improved with aggressive treatment of his blood sugars and cholesterol.  Encouraged to continue dietary modification and significant weight loss.  We will plan followup in 4 months with CMP, INR prior.  Further pending clinical course and the results of the above.      The following portions of the patient's history were reviewed and updated as appropriate:   Past Medical History:   Diagnosis Date   • Diabetes mellitus (CMS/HCC)    • Elevated liver enzymes    • Elevated liver enzymes    • Gall stones    • GERD (gastroesophageal reflux disease)    • GERD (gastroesophageal reflux disease)    • Hypertension    • LINDSEY (nonalcoholic steatohepatitis)    • LINDSEY (nonalcoholic steatohepatitis)      Past Surgical History:   Procedure Laterality Date   • GALLBLADDER SURGERY     • GASTRIC BYPASS     • SHOULDER SURGERY     • SKIN GRAFT  "      Family History   Problem Relation Age of Onset   • Hypertension Mother    • Heart disease Father    • Hypertension Father    • Cancer Brother    • Cancer Paternal Aunt    • Diabetes Paternal Aunt    • Cancer Paternal Grandmother    • Heart disease Paternal Grandfather    • No Known Problems Sister    • No Known Problems Sister    • Autism Son        No Known Allergies  Social History     Socioeconomic History   • Marital status:      Spouse name: Not on file   • Number of children: Not on file   • Years of education: Not on file   • Highest education level: Not on file   Tobacco Use   • Smoking status: Never Smoker   • Smokeless tobacco: Former User   Substance and Sexual Activity   • Alcohol use: Yes     Alcohol/week: 3.6 oz     Types: 6 Cans of beer per week     Frequency: 2-3 times a week   • Drug use: No   • Sexual activity: Defer     Current Medications:  Prior to Admission medications    Medication Sig Start Date End Date Taking? Authorizing Provider   atorvastatin (LIPITOR) 40 MG tablet Take 40 mg by mouth Daily.   Yes Dmitriy Soto MD   Cholecalciferol (VITAMIN D3) 2000 units tablet Take  by mouth Daily.   Yes Dmitriy Soto MD   Cyanocobalamin 1000 MCG/ML kit Inject  as directed Every 30 (Thirty) Days.   Yes Dmitriy Soto MD   folic acid (FOLVITE) 1 MG tablet Take 1 mg by mouth Daily.   Yes Dmitriy Soto MD   glipiZIDE (GLUCOTROL) 5 MG tablet Take 5 mg by mouth Daily.   Yes Dmitriy Soto MD   insulin glargine (LANTUS) 100 UNIT/ML injection Inject 90 Units under the skin Daily.   Yes Dmitriy Soto MD   LISINOPRIL PO Take  by mouth.   Yes Dmitriy Soto MD   omeprazole (priLOSEC) 20 MG capsule Take 20 mg by mouth Daily.   Yes Dmitriy Soto MD     Review of Systems  Review of Systems       Objective    /82 (BP Location: Left arm)   Pulse 99   Ht 172.7 cm (68\")   Wt 121 kg (266 lb 3.2 oz)   BMI 40.48 kg/m²   Physical Exam "   Constitutional: He is oriented to person, place, and time. He appears well-developed and well-nourished. No distress.   HENT:   Head: Normocephalic and atraumatic.   Eyes: EOM are normal. Pupils are equal, round, and reactive to light.   Neck: Normal range of motion.   Cardiovascular: Normal rate, regular rhythm and normal heart sounds.   Pulmonary/Chest: Effort normal and breath sounds normal.   Abdominal: Soft. Bowel sounds are normal. He exhibits no shifting dullness, no distension, no abdominal bruit, no ascites and no mass. There is no hepatosplenomegaly. There is tenderness. There is no rigidity, no rebound, no guarding and no CVA tenderness. No hernia. Hernia confirmed negative in the ventral area.   Mild, obese, diffuse   Musculoskeletal: Normal range of motion.   Neurological: He is alert and oriented to person, place, and time.   Skin: Skin is warm and dry.   Psychiatric: He has a normal mood and affect. His behavior is normal. Judgment and thought content normal.   Nursing note and vitals reviewed.    Assessment/Plan      1. LINDSEY (nonalcoholic steatohepatitis)    2. Elevated liver enzymes    3. Gastroesophageal reflux disease with esophagitis    .   Rubén was seen today for lindsey, heartburn and elevated hepatic enzymes.    Diagnoses and all orders for this visit:    LINDSEY (nonalcoholic steatohepatitis)  -     Comprehensive Metabolic Panel; Future  -     Protime-INR; Future    Elevated liver enzymes  -     Comprehensive Metabolic Panel; Future  -     Protime-INR; Future    Gastroesophageal reflux disease with esophagitis        Orders placed during this encounter include:  Orders Placed This Encounter   Procedures   • Comprehensive Metabolic Panel     Due prior to follow up in Feb     Standing Status:   Future     Standing Expiration Date:   2/29/2020   • Protime-INR     Due prior to follow up in Feb     Standing Status:   Future     Standing Expiration Date:   2/29/2020       Medications prescribed:  No  orders of the defined types were placed in this encounter.      Requested Prescriptions      No prescriptions requested or ordered in this encounter       Review and/or summary of lab tests, radiology, procedures, medications. Review and summary of old records and obtaining of history. The risks and benefits of my recommendations, as well as other treatment options were discussed with the patient today. Questions were answered.    Follow-up: Return in about 4 months (around 2/21/2020), or if symptoms worsen or fail to improve, for lab prior.     * Surgery not found *      This document has been electronically signed by Ivan Nichols PA-C on October 21, 2019 4:36 PM      Results for orders placed or performed in visit on 10/14/19   LINDSEY Fibrosure   Result Value Ref Range    Fibrosis Score (References) 0.52 (H) 0.00 - 0.21    Fibrosis Stage (Reference) Comment     Steatosis Score (Reference) 0.54 (H) 0.00 - 0.30    Steatosis Grade (Reference) Comment     LINDSEY Score (Reference) 0.50 (H) 0.25    Lindsey Grade (Reference) Comment     Height: (Reference) 68 in    Weight: (Reference) 270 LBS    Alpha 2-Macroglobulins, Qn 233 110 - 276 mg/dL    Haptoglobin 95 34 - 200 mg/dL    Apolipoprotein A-1 137 101 - 178 mg/dL    Total Bilirubin 1.1 0.0 - 1.2 mg/dL    GGT 38 0 - 65 IU/L    ALT (SGPT) 27 0 - 55 IU/L    AST (SGOT) P5P (Reference) 28 0 - 40 IU/L    Cholesterol, Total (Reference) 105 100 - 199 mg/dL    Glucose, Serum (Reference) 144 (H) 65 - 99 mg/dL    Triglycerides 176 (H) 0 - 149 mg/dL    Interpretations: (Reference) Comment     Fibrosis Scoring: Comment     Steatosis Grading (Reference) Comment     Lindsey Scoring (Reference) Comment     Limitations: (Reference) Comment     Comment (Reference) Comment    AFP Tumor Marker   Result Value Ref Range    ALPHA-FETOPROTEIN 5.10 0 - 8.3 ng/mL   Protime-INR   Result Value Ref Range    Protime 12.8 11.1 - 15.3 Seconds    INR 0.98 0.80 - 1.20   Results for orders placed or  performed in visit on 12/19/18   Protime-INR   Result Value Ref Range    Protime 13.3 11.1 - 15.3 Seconds    INR 1.03 0.80 - 1.20   Comprehensive Metabolic Panel   Result Value Ref Range    Glucose 166 (H) 60 - 100 mg/dL    BUN 18 7 - 21 mg/dL    Creatinine 1.12 0.70 - 1.30 mg/dL    Sodium 140 137 - 145 mmol/L    Potassium 4.3 3.5 - 5.1 mmol/L    Chloride 100 95 - 110 mmol/L    CO2 27.0 22.0 - 31.0 mmol/L    Calcium 9.4 8.4 - 10.2 mg/dL    Total Protein 7.5 6.3 - 8.6 g/dL    Albumin 4.40 3.40 - 4.80 g/dL    ALT (SGPT) 42 21 - 72 U/L    AST (SGOT) 45 17 - 59 U/L    Alkaline Phosphatase 90 38 - 126 U/L    Total Bilirubin 1.5 (H) 0.2 - 1.3 mg/dL    eGFR Non  Amer 69 56 - 130 mL/min/1.73    Globulin 3.1 2.3 - 3.5 gm/dL    A/G Ratio 1.4 1.1 - 1.8 g/dL    BUN/Creatinine Ratio 16.1 7.0 - 25.0    Anion Gap 13.0 5.0 - 15.0 mmol/L   Results for orders placed or performed in visit on 08/06/18   LINDSEY Fibrosure   Result Value Ref Range    Fibrosis Score (References) 0.68 (H) 0.00 - 0.21    Fibrosis Stage (Reference) Comment     Steatosis Score (Reference) 0.72 (H) 0.00 - 0.30    Steatosis Grade (Reference) Comment     LINDSEY Score (Reference) 0.75 0.25    Lindsey Grade (Reference) Comment     Height: (Reference) 68 in    Weight: (Reference) 245 LBS    Alpha 2-Macroglobulins, Qn 280 (H) 110 - 276 mg/dL    Haptoglobin 126 34 - 200 mg/dL    Apolipoprotein A-1 144 101 - 178 mg/dL    Total Bilirubin 2.1 (H) 0.0 - 1.2 mg/dL    GGT 45 0 - 65 IU/L    ALT (SGPT) 38 0 - 55 IU/L    AST (SGOT) P5P (Reference) 35 0 - 40 IU/L    Cholesterol, Total (Reference) 111 100 - 199 mg/dL    Glucose, Serum (Reference) 381 (H) 65 - 99 mg/dL    Triglycerides 233 (H) 0 - 149 mg/dL    Interpretations: (Reference) Comment     Fibrosis Scoring: Comment     Steatosis Grading (Reference) Comment     Lindsey Scoring (Reference) Comment     Limitations: (Reference) Comment     Comment (Reference) Comment    Nuclear Antigen Antibody, IFA   Result Value Ref Range     RONDA Negative    Iron and TIBC   Result Value Ref Range    Iron 77 49 - 181 mcg/dL    TIBC 374 261 - 462 mcg/dL    Iron Saturation 21 20 - 55 %   Alpha - 1 - Antitrypsin   Result Value Ref Range    A-1 Antitrypsin 120 90 - 200 mg/dL   Mitochondrial Antibodies, M2   Result Value Ref Range    Mitochondrial Ab <20.0 0.0 - 20.0 Units   Ceruloplasmin   Result Value Ref Range    Ceruloplasmin 26.3 16.0 - 31.0 mg/dL   AFP Tumor Marker   Result Value Ref Range    AFP Tumor Marker 4.9 0.0 - 8.3 ng/mL   Hepatitis Panel, Acute   Result Value Ref Range    Hepatitis C Ab Negative Negative    Hep A IgM Negative Negative    Hep B C IgM Negative Negative    Hepatitis B Surface Ag Negative Negative   Anti-Smooth Muscle Antibody Titer   Result Value Ref Range    Smooth Muscle Ab 3 0 - 19 Units   Protime-INR   Result Value Ref Range    Protime 13.5 11.1 - 15.3 Seconds    INR 1.05 0.80 - 1.20   Haptoglobin   Result Value Ref Range    Haptoglobin 121 34 - 200 mg/dL   Ferritin   Result Value Ref Range    Ferritin 47.10 17.90 - 464.00 ng/mL   Hepatic Function Panel   Result Value Ref Range    Total Protein 7.5 6.3 - 8.6 g/dL    Albumin 4.40 3.40 - 4.80 g/dL    ALT (SGPT) 50 21 - 72 U/L    AST (SGOT) 48 17 - 59 U/L    Alkaline Phosphatase 138 (H) 38 - 126 U/L    Total Bilirubin 2.8 (H) 0.2 - 1.3 mg/dL    Bilirubin, Direct 0.0 0.0 - 0.3 mg/dL    Bilirubin, Indirect 2.0 (H) 0.0 - 1.1 mg/dL   Results for orders placed or performed during the hospital encounter of 08/13/15   Manual Differential   Result Value Ref Range    Neutrophil Rel % 66 37 - 80 %    Lymphocyte Rel % 21 10 - 50 %    Monocyte Rel % 5 0 - 12 %    Eosinophil Rel % 3 0 - 7 %    Basophil Rel % 2 0 - 2 %    Atypical Lymphocytes Rel % 3 (H) 0 - 0 %    RBC Morphology Normocytic Normochromic     Platelet Estimate Decreased     Total Counted 100      *Note: Due to a large number of results and/or encounters for the requested time period, some results have not been displayed. A  complete set of results can be found in Results Review.       Some portions of this note have been dictated using voice recognition software and may contain errors and/or omissions.

## 2020-02-18 ENCOUNTER — LAB (OUTPATIENT)
Dept: LAB | Facility: HOSPITAL | Age: 55
End: 2020-02-18

## 2020-02-18 DIAGNOSIS — R74.8 ELEVATED LIVER ENZYMES: ICD-10-CM

## 2020-02-18 DIAGNOSIS — K75.81 NASH (NONALCOHOLIC STEATOHEPATITIS): ICD-10-CM

## 2020-02-18 LAB
ALBUMIN SERPL-MCNC: 4.1 G/DL (ref 3.5–5.2)
ALBUMIN/GLOB SERPL: 1.4 G/DL
ALP SERPL-CCNC: 96 U/L (ref 39–117)
ALT SERPL W P-5'-P-CCNC: 31 U/L (ref 1–41)
ANION GAP SERPL CALCULATED.3IONS-SCNC: 12.8 MMOL/L (ref 5–15)
AST SERPL-CCNC: 24 U/L (ref 1–40)
BILIRUB SERPL-MCNC: 1.9 MG/DL (ref 0.2–1.2)
BUN BLD-MCNC: 15 MG/DL (ref 6–20)
BUN/CREAT SERPL: 14.4 (ref 7–25)
CALCIUM SPEC-SCNC: 9.8 MG/DL (ref 8.6–10.5)
CHLORIDE SERPL-SCNC: 98 MMOL/L (ref 98–107)
CO2 SERPL-SCNC: 27.2 MMOL/L (ref 22–29)
CREAT BLD-MCNC: 1.04 MG/DL (ref 0.76–1.27)
GFR SERPL CREATININE-BSD FRML MDRD: 75 ML/MIN/1.73
GLOBULIN UR ELPH-MCNC: 3 GM/DL
GLUCOSE BLD-MCNC: 262 MG/DL (ref 65–99)
INR PPP: 0.99 (ref 0.8–1.2)
POTASSIUM BLD-SCNC: 4.3 MMOL/L (ref 3.5–5.2)
PROT SERPL-MCNC: 7.1 G/DL (ref 6–8.5)
PROTHROMBIN TIME: 12.9 SECONDS (ref 11.1–15.3)
SODIUM BLD-SCNC: 138 MMOL/L (ref 136–145)

## 2020-02-18 PROCEDURE — 80053 COMPREHEN METABOLIC PANEL: CPT

## 2020-02-18 PROCEDURE — 85610 PROTHROMBIN TIME: CPT

## 2020-02-18 PROCEDURE — 36415 COLL VENOUS BLD VENIPUNCTURE: CPT

## 2020-02-24 ENCOUNTER — OFFICE VISIT (OUTPATIENT)
Dept: GASTROENTEROLOGY | Facility: CLINIC | Age: 55
End: 2020-02-24

## 2020-02-24 VITALS
WEIGHT: 262.8 LBS | DIASTOLIC BLOOD PRESSURE: 82 MMHG | SYSTOLIC BLOOD PRESSURE: 132 MMHG | HEIGHT: 68 IN | BODY MASS INDEX: 39.83 KG/M2 | HEART RATE: 113 BPM

## 2020-02-24 DIAGNOSIS — R74.8 ELEVATED LIVER ENZYMES: ICD-10-CM

## 2020-02-24 DIAGNOSIS — K75.81 NASH (NONALCOHOLIC STEATOHEPATITIS): Primary | ICD-10-CM

## 2020-02-24 DIAGNOSIS — K21.00 GASTROESOPHAGEAL REFLUX DISEASE WITH ESOPHAGITIS: ICD-10-CM

## 2020-02-24 DIAGNOSIS — R17 ELEVATED BILIRUBIN: ICD-10-CM

## 2020-02-24 PROCEDURE — 99213 OFFICE O/P EST LOW 20 MIN: CPT | Performed by: PHYSICIAN ASSISTANT

## 2020-02-24 RX ORDER — AZITHROMYCIN 500 MG/1
500 TABLET, FILM COATED ORAL TAKE AS DIRECTED
COMMUNITY
End: 2020-08-21 | Stop reason: HOSPADM

## 2020-02-24 RX ORDER — ALBUTEROL SULFATE 90 UG/1
2 AEROSOL, METERED RESPIRATORY (INHALATION) EVERY 4 HOURS PRN
COMMUNITY
End: 2022-11-06 | Stop reason: SDUPTHER

## 2020-02-24 NOTE — PATIENT INSTRUCTIONS

## 2020-02-24 NOTE — PROGRESS NOTES
Chief Complaint   Patient presents with   • Lindsey   • Heartburn   • Elevated Hepatic Enzymes       ENDO PROCEDURE ORDERED:    Subjective    Rubén Maki is a 54 y.o. male. he is here today for follow-up.    History of Present Illness    Patient was seen on a recheck of his GERD, LINDSEY, elevated liver enzymes, F2/S1/S2/N1.  Last seen 10/21/2019.  Patient underwent laboratories on 02/18/2020 that showed an INR 0.99, CMP showed glucose 262, total bilirubin 1.9, otherwise normal.  Patient currently denies abdominal pain.  He has had chronic heartburn, doing well on the Prilosec.  Denied nausea, vomiting, dysphagia.  Bowels are moving without blood or mucus.  Weight is down 3.5 pounds since last visit.  He has had some recent allergies and respiratory problems.  [facility] and states he got some medications and is doing better.  He is referring for the [facility] and states he is going to be busy coming up.  He believes [facility] and has never had a colonoscopy.    ASSESSMENT/PLAN:  Patient with significant steatohepatitis with mild elevation in bilirubin secondary to above, chronic GERD, again encouraged dietary modification and significant weight loss.  As long as he is doing well, we will plan followup in 6 months, may need to consider repeat laboratories at that time if not done prior.      The following portions of the patient's history were reviewed and updated as appropriate:   Past Medical History:   Diagnosis Date   • Diabetes mellitus (CMS/HCC)    • Elevated liver enzymes    • Elevated liver enzymes    • Gall stones    • GERD (gastroesophageal reflux disease)    • GERD (gastroesophageal reflux disease)    • Hypertension    • LINDSEY (nonalcoholic steatohepatitis)    • LINDSEY (nonalcoholic steatohepatitis)      Past Surgical History:   Procedure Laterality Date   • GALLBLADDER SURGERY     • GASTRIC BYPASS     • SHOULDER SURGERY     • SKIN GRAFT       Family History    Problem Relation Age of Onset   • Hypertension Mother    • Heart disease Father    • Hypertension Father    • Cancer Brother    • Cancer Paternal Aunt    • Diabetes Paternal Aunt    • Cancer Paternal Grandmother    • Heart disease Paternal Grandfather    • No Known Problems Sister    • No Known Problems Sister    • Autism Son        No Known Allergies  Social History     Socioeconomic History   • Marital status:      Spouse name: Not on file   • Number of children: Not on file   • Years of education: Not on file   • Highest education level: Not on file   Tobacco Use   • Smoking status: Never Smoker   • Smokeless tobacco: Former User   Substance and Sexual Activity   • Alcohol use: Yes     Alcohol/week: 6.0 standard drinks     Types: 6 Cans of beer per week     Frequency: 2-3 times a week   • Drug use: No   • Sexual activity: Defer     Current Medications:  Prior to Admission medications    Medication Sig Start Date End Date Taking? Authorizing Provider   albuterol sulfate  (90 Base) MCG/ACT inhaler Inhale 2 puffs Every 4 (Four) Hours As Needed for Wheezing.   Yes Dmitriy Soto MD   atorvastatin (LIPITOR) 40 MG tablet Take 40 mg by mouth Daily.   Yes Dmitriy Soto MD   azithromycin (ZITHROMAX) 500 MG tablet Take 500 mg by mouth Take As Directed.   Yes Dmitriy Soto MD   Cholecalciferol (VITAMIN D3) 2000 units tablet Take  by mouth Daily.   Yes Dmitriy Soto MD   Cyanocobalamin 1000 MCG/ML kit Inject  as directed Every 30 (Thirty) Days.   Yes Dmitriy Soto MD   folic acid (FOLVITE) 1 MG tablet Take 1 mg by mouth Daily.   Yes Dmitriy Soto MD   glipiZIDE (GLUCOTROL) 5 MG tablet Take 5 mg by mouth Daily.   Yes Dmitriy Soto MD   insulin glargine (LANTUS) 100 UNIT/ML injection Inject 90 Units under the skin Daily.   Yes Dmitriy Soto MD   LISINOPRIL PO Take 10 mg by mouth Daily.   Yes Dmitriy Soto MD   omeprazole (priLOSEC) 20 MG  "capsule Take 20 mg by mouth Daily.   Yes Provider, MD Dmitriy     Review of Systems  Review of Systems       Objective    /82 (BP Location: Left arm)   Pulse 113   Ht 172.7 cm (68\")   Wt 119 kg (262 lb 12.8 oz)   BMI 39.96 kg/m²   Physical Exam   Constitutional: He is oriented to person, place, and time. He appears well-developed and well-nourished. No distress.   HENT:   Head: Normocephalic and atraumatic.   Eyes: Pupils are equal, round, and reactive to light. EOM are normal.   Neck: Normal range of motion.   Cardiovascular: Normal rate, regular rhythm and normal heart sounds.   Pulmonary/Chest: Effort normal and breath sounds normal.   Abdominal: Soft. Bowel sounds are normal. He exhibits no shifting dullness, no distension, no abdominal bruit, no ascites and no mass. There is no hepatosplenomegaly. There is tenderness. There is no rigidity, no rebound, no guarding and no CVA tenderness. No hernia. Hernia confirmed negative in the ventral area.   Mild, obese, diffuse   Musculoskeletal: Normal range of motion.   Neurological: He is alert and oriented to person, place, and time.   Skin: Skin is warm and dry.   Psychiatric: He has a normal mood and affect. His behavior is normal. Judgment and thought content normal.   Nursing note and vitals reviewed.    Assessment/Plan      1. LINDSEY (nonalcoholic steatohepatitis)    2. Elevated liver enzymes    3. Gastroesophageal reflux disease with esophagitis    4. Elevated bilirubin    .   Rubén was seen today for lindsey, heartburn and elevated hepatic enzymes.    Diagnoses and all orders for this visit:    LINDSEY (nonalcoholic steatohepatitis)    Elevated liver enzymes    Gastroesophageal reflux disease with esophagitis    Elevated bilirubin        Orders placed during this encounter include:  No orders of the defined types were placed in this encounter.      Medications prescribed:  No orders of the defined types were placed in this encounter.      Requested " Prescriptions      No prescriptions requested or ordered in this encounter       Review and/or summary of lab tests, radiology, procedures, medications. Review and summary of old records and obtaining of history. The risks and benefits of my recommendations, as well as other treatment options were discussed with the patient today. Questions were answered.    Follow-up: Return in about 6 months (around 8/24/2020), or if symptoms worsen or fail to improve.     * Surgery not found *      This document has been electronically signed by Ivan Nichols PA-C on February 24, 2020 6:09 PM      Results for orders placed or performed in visit on 02/18/20   Protime-INR   Result Value Ref Range    Protime 12.9 11.1 - 15.3 Seconds    INR 0.99 0.80 - 1.20   Comprehensive Metabolic Panel   Result Value Ref Range    Glucose 262 (H) 65 - 99 mg/dL    BUN 15 6 - 20 mg/dL    Creatinine 1.04 0.76 - 1.27 mg/dL    Sodium 138 136 - 145 mmol/L    Potassium 4.3 3.5 - 5.2 mmol/L    Chloride 98 98 - 107 mmol/L    CO2 27.2 22.0 - 29.0 mmol/L    Calcium 9.8 8.6 - 10.5 mg/dL    Total Protein 7.1 6.0 - 8.5 g/dL    Albumin 4.10 3.50 - 5.20 g/dL    ALT (SGPT) 31 1 - 41 U/L    AST (SGOT) 24 1 - 40 U/L    Alkaline Phosphatase 96 39 - 117 U/L    Total Bilirubin 1.9 (H) 0.2 - 1.2 mg/dL    eGFR Non African Amer 75 >60 mL/min/1.73    Globulin 3.0 gm/dL    A/G Ratio 1.4 g/dL    BUN/Creatinine Ratio 14.4 7.0 - 25.0    Anion Gap 12.8 5.0 - 15.0 mmol/L   Results for orders placed or performed in visit on 10/14/19   LINDSEY Fibrosure   Result Value Ref Range    Fibrosis Score (References) 0.52 (H) 0.00 - 0.21    Fibrosis Stage (Reference) Comment     Steatosis Score (Reference) 0.54 (H) 0.00 - 0.30    Steatosis Grade (Reference) Comment     LINDSEY Score (Reference) 0.50 (H) 0.25    Lindsey Grade (Reference) Comment     Height: (Reference) 68 in    Weight: (Reference) 270 LBS    Alpha 2-Macroglobulins, Qn 233 110 - 276 mg/dL    Haptoglobin 95 34 - 200 mg/dL     Apolipoprotein A-1 137 101 - 178 mg/dL    Total Bilirubin 1.1 0.0 - 1.2 mg/dL    GGT 38 0 - 65 IU/L    ALT (SGPT) 27 0 - 55 IU/L    AST (SGOT) P5P (Reference) 28 0 - 40 IU/L    Cholesterol, Total (Reference) 105 100 - 199 mg/dL    Glucose, Serum (Reference) 144 (H) 65 - 99 mg/dL    Triglycerides 176 (H) 0 - 149 mg/dL    Interpretations: (Reference) Comment     Fibrosis Scoring: Comment     Steatosis Grading (Reference) Comment     Lindsey Scoring (Reference) Comment     Limitations: (Reference) Comment     Comment (Reference) Comment    AFP Tumor Marker   Result Value Ref Range    ALPHA-FETOPROTEIN 5.10 0 - 8.3 ng/mL   Protime-INR   Result Value Ref Range    Protime 12.8 11.1 - 15.3 Seconds    INR 0.98 0.80 - 1.20   Results for orders placed or performed in visit on 12/19/18   Protime-INR   Result Value Ref Range    Protime 13.3 11.1 - 15.3 Seconds    INR 1.03 0.80 - 1.20   Comprehensive Metabolic Panel   Result Value Ref Range    Glucose 166 (H) 60 - 100 mg/dL    BUN 18 7 - 21 mg/dL    Creatinine 1.12 0.70 - 1.30 mg/dL    Sodium 140 137 - 145 mmol/L    Potassium 4.3 3.5 - 5.1 mmol/L    Chloride 100 95 - 110 mmol/L    CO2 27.0 22.0 - 31.0 mmol/L    Calcium 9.4 8.4 - 10.2 mg/dL    Total Protein 7.5 6.3 - 8.6 g/dL    Albumin 4.40 3.40 - 4.80 g/dL    ALT (SGPT) 42 21 - 72 U/L    AST (SGOT) 45 17 - 59 U/L    Alkaline Phosphatase 90 38 - 126 U/L    Total Bilirubin 1.5 (H) 0.2 - 1.3 mg/dL    eGFR Non  Amer 69 56 - 130 mL/min/1.73    Globulin 3.1 2.3 - 3.5 gm/dL    A/G Ratio 1.4 1.1 - 1.8 g/dL    BUN/Creatinine Ratio 16.1 7.0 - 25.0    Anion Gap 13.0 5.0 - 15.0 mmol/L   Results for orders placed or performed in visit on 08/06/18   LINDSEY Fibrosure   Result Value Ref Range    Fibrosis Score (References) 0.68 (H) 0.00 - 0.21    Fibrosis Stage (Reference) Comment     Steatosis Score (Reference) 0.72 (H) 0.00 - 0.30    Steatosis Grade (Reference) Comment     LINDSEY Score (Reference) 0.75 0.25    Lindsey Grade (Reference) Comment      Height: (Reference) 68 in    Weight: (Reference) 245 LBS    Alpha 2-Macroglobulins, Qn 280 (H) 110 - 276 mg/dL    Haptoglobin 126 34 - 200 mg/dL    Apolipoprotein A-1 144 101 - 178 mg/dL    Total Bilirubin 2.1 (H) 0.0 - 1.2 mg/dL    GGT 45 0 - 65 IU/L    ALT (SGPT) 38 0 - 55 IU/L    AST (SGOT) P5P (Reference) 35 0 - 40 IU/L    Cholesterol, Total (Reference) 111 100 - 199 mg/dL    Glucose, Serum (Reference) 381 (H) 65 - 99 mg/dL    Triglycerides 233 (H) 0 - 149 mg/dL    Interpretations: (Reference) Comment     Fibrosis Scoring: Comment     Steatosis Grading (Reference) Comment     Araya Scoring (Reference) Comment     Limitations: (Reference) Comment     Comment (Reference) Comment    Nuclear Antigen Antibody, IFA   Result Value Ref Range    RONDA Negative    Iron and TIBC   Result Value Ref Range    Iron 77 49 - 181 mcg/dL    TIBC 374 261 - 462 mcg/dL    Iron Saturation 21 20 - 55 %   Alpha - 1 - Antitrypsin   Result Value Ref Range    A-1 Antitrypsin 120 90 - 200 mg/dL   Mitochondrial Antibodies, M2   Result Value Ref Range    Mitochondrial Ab <20.0 0.0 - 20.0 Units   Ceruloplasmin   Result Value Ref Range    Ceruloplasmin 26.3 16.0 - 31.0 mg/dL     *Note: Due to a large number of results and/or encounters for the requested time period, some results have not been displayed. A complete set of results can be found in Results Review.       Some portions of this note have been dictated using voice recognition software and may contain errors and/or omissions.

## 2020-08-17 ENCOUNTER — LAB (OUTPATIENT)
Dept: LAB | Facility: HOSPITAL | Age: 55
End: 2020-08-17

## 2020-08-20 ENCOUNTER — HOSPITAL ENCOUNTER (OUTPATIENT)
Facility: HOSPITAL | Age: 55
Setting detail: OBSERVATION
Discharge: HOME | End: 2020-08-21
Attending: STUDENT IN AN ORGANIZED HEALTH CARE EDUCATION/TRAINING PROGRAM | Admitting: FAMILY MEDICINE

## 2020-08-20 ENCOUNTER — APPOINTMENT (OUTPATIENT)
Dept: GENERAL RADIOLOGY | Facility: HOSPITAL | Age: 55
End: 2020-08-20

## 2020-08-20 DIAGNOSIS — I47.1 SVT (SUPRAVENTRICULAR TACHYCARDIA) (HCC): Primary | ICD-10-CM

## 2020-08-20 DIAGNOSIS — N17.9 AKI (ACUTE KIDNEY INJURY) (HCC): ICD-10-CM

## 2020-08-20 LAB
ALBUMIN SERPL-MCNC: 4.3 G/DL (ref 3.5–5.2)
ALBUMIN/GLOB SERPL: 1.3 G/DL
ALP SERPL-CCNC: 147 U/L (ref 39–117)
ALT SERPL W P-5'-P-CCNC: 44 U/L (ref 1–41)
ANION GAP SERPL CALCULATED.3IONS-SCNC: 19 MMOL/L (ref 5–15)
AST SERPL-CCNC: 49 U/L (ref 1–40)
BACTERIA UR QL AUTO: ABNORMAL /HPF
BASOPHILS # BLD AUTO: 0.12 10*3/MM3 (ref 0–0.2)
BASOPHILS NFR BLD AUTO: 0.9 % (ref 0–1.5)
BILIRUB SERPL-MCNC: 2.1 MG/DL (ref 0–1.2)
BILIRUB UR QL STRIP: NEGATIVE
BUN SERPL-MCNC: 13 MG/DL (ref 6–20)
BUN/CREAT SERPL: 8.5 (ref 7–25)
CALCIUM SPEC-SCNC: 9.9 MG/DL (ref 8.6–10.5)
CHLORIDE SERPL-SCNC: 93 MMOL/L (ref 98–107)
CLARITY UR: CLEAR
CO2 SERPL-SCNC: 21 MMOL/L (ref 22–29)
COLOR UR: YELLOW
CREAT SERPL-MCNC: 1.53 MG/DL (ref 0.76–1.27)
DEPRECATED RDW RBC AUTO: 36.8 FL (ref 37–54)
EOSINOPHIL # BLD AUTO: 0.13 10*3/MM3 (ref 0–0.4)
EOSINOPHIL NFR BLD AUTO: 1 % (ref 0.3–6.2)
ERYTHROCYTE [DISTWIDTH] IN BLOOD BY AUTOMATED COUNT: 12.1 % (ref 12.3–15.4)
GFR SERPL CREATININE-BSD FRML MDRD: 48 ML/MIN/1.73
GLOBULIN UR ELPH-MCNC: 3.2 GM/DL
GLUCOSE SERPL-MCNC: 529 MG/DL (ref 65–99)
GLUCOSE UR STRIP-MCNC: ABNORMAL MG/DL
HCT VFR BLD AUTO: 49.5 % (ref 37.5–51)
HGB BLD-MCNC: 17.7 G/DL (ref 13–17.7)
HGB UR QL STRIP.AUTO: NEGATIVE
HOLD SPECIMEN: NORMAL
HOLD SPECIMEN: NORMAL
HYALINE CASTS UR QL AUTO: ABNORMAL /LPF
IMM GRANULOCYTES # BLD AUTO: 0.04 10*3/MM3 (ref 0–0.05)
IMM GRANULOCYTES NFR BLD AUTO: 0.3 % (ref 0–0.5)
KETONES UR QL STRIP: NEGATIVE
LEUKOCYTE ESTERASE UR QL STRIP.AUTO: NEGATIVE
LYMPHOCYTES # BLD AUTO: 1.84 10*3/MM3 (ref 0.7–3.1)
LYMPHOCYTES NFR BLD AUTO: 14.1 % (ref 19.6–45.3)
MAGNESIUM SERPL-MCNC: 1.8 MG/DL (ref 1.6–2.6)
MCH RBC QN AUTO: 29.9 PG (ref 26.6–33)
MCHC RBC AUTO-ENTMCNC: 35.8 G/DL (ref 31.5–35.7)
MCV RBC AUTO: 83.6 FL (ref 79–97)
MONOCYTES # BLD AUTO: 1 10*3/MM3 (ref 0.1–0.9)
MONOCYTES NFR BLD AUTO: 7.7 % (ref 5–12)
NEUTROPHILS NFR BLD AUTO: 76 % (ref 42.7–76)
NEUTROPHILS NFR BLD AUTO: 9.9 10*3/MM3 (ref 1.7–7)
NITRITE UR QL STRIP: NEGATIVE
NRBC BLD AUTO-RTO: 0 /100 WBC (ref 0–0.2)
NT-PROBNP SERPL-MCNC: 58.1 PG/ML (ref 0–900)
PH UR STRIP.AUTO: 5.5 [PH] (ref 5–9)
PLATELET # BLD AUTO: 106 10*3/MM3 (ref 140–450)
PMV BLD AUTO: 13.7 FL (ref 6–12)
POTASSIUM SERPL-SCNC: 4.3 MMOL/L (ref 3.5–5.2)
PROT SERPL-MCNC: 7.5 G/DL (ref 6–8.5)
PROT UR QL STRIP: ABNORMAL
RBC # BLD AUTO: 5.92 10*6/MM3 (ref 4.14–5.8)
RBC # UR: ABNORMAL /HPF
RBC MORPH BLD: NORMAL
REF LAB TEST METHOD: ABNORMAL
SMALL PLATELETS BLD QL SMEAR: NORMAL
SODIUM SERPL-SCNC: 133 MMOL/L (ref 136–145)
SP GR UR STRIP: 1.04 (ref 1–1.03)
SQUAMOUS #/AREA URNS HPF: ABNORMAL /HPF
TROPONIN T SERPL-MCNC: <0.01 NG/ML (ref 0–0.03)
UROBILINOGEN UR QL STRIP: ABNORMAL
WBC # BLD AUTO: 13.03 10*3/MM3 (ref 3.4–10.8)
WBC MORPH BLD: NORMAL
WBC UR QL AUTO: ABNORMAL /HPF
WHOLE BLOOD HOLD SPECIMEN: NORMAL
WHOLE BLOOD HOLD SPECIMEN: NORMAL

## 2020-08-20 PROCEDURE — 82962 GLUCOSE BLOOD TEST: CPT

## 2020-08-20 PROCEDURE — 93005 ELECTROCARDIOGRAM TRACING: CPT

## 2020-08-20 PROCEDURE — 93005 ELECTROCARDIOGRAM TRACING: CPT | Performed by: STUDENT IN AN ORGANIZED HEALTH CARE EDUCATION/TRAINING PROGRAM

## 2020-08-20 PROCEDURE — 81001 URINALYSIS AUTO W/SCOPE: CPT | Performed by: STUDENT IN AN ORGANIZED HEALTH CARE EDUCATION/TRAINING PROGRAM

## 2020-08-20 PROCEDURE — G0378 HOSPITAL OBSERVATION PER HR: HCPCS

## 2020-08-20 PROCEDURE — 83880 ASSAY OF NATRIURETIC PEPTIDE: CPT | Performed by: STUDENT IN AN ORGANIZED HEALTH CARE EDUCATION/TRAINING PROGRAM

## 2020-08-20 PROCEDURE — 83735 ASSAY OF MAGNESIUM: CPT | Performed by: STUDENT IN AN ORGANIZED HEALTH CARE EDUCATION/TRAINING PROGRAM

## 2020-08-20 PROCEDURE — 25010000002 ADENOSINE PER 6 MG

## 2020-08-20 PROCEDURE — 25010000002 ENOXAPARIN PER 10 MG: Performed by: HOSPITALIST

## 2020-08-20 PROCEDURE — 96374 THER/PROPH/DIAG INJ IV PUSH: CPT

## 2020-08-20 PROCEDURE — 84484 ASSAY OF TROPONIN QUANT: CPT | Performed by: STUDENT IN AN ORGANIZED HEALTH CARE EDUCATION/TRAINING PROGRAM

## 2020-08-20 PROCEDURE — 85007 BL SMEAR W/DIFF WBC COUNT: CPT | Performed by: STUDENT IN AN ORGANIZED HEALTH CARE EDUCATION/TRAINING PROGRAM

## 2020-08-20 PROCEDURE — 85025 COMPLETE CBC W/AUTO DIFF WBC: CPT | Performed by: STUDENT IN AN ORGANIZED HEALTH CARE EDUCATION/TRAINING PROGRAM

## 2020-08-20 PROCEDURE — 63710000001 INSULIN DETEMIR PER 5 UNITS: Performed by: HOSPITALIST

## 2020-08-20 PROCEDURE — 71045 X-RAY EXAM CHEST 1 VIEW: CPT

## 2020-08-20 PROCEDURE — 99284 EMERGENCY DEPT VISIT MOD MDM: CPT

## 2020-08-20 PROCEDURE — 80053 COMPREHEN METABOLIC PANEL: CPT | Performed by: STUDENT IN AN ORGANIZED HEALTH CARE EDUCATION/TRAINING PROGRAM

## 2020-08-20 PROCEDURE — 96372 THER/PROPH/DIAG INJ SC/IM: CPT

## 2020-08-20 PROCEDURE — 93010 ELECTROCARDIOGRAM REPORT: CPT | Performed by: INTERNAL MEDICINE

## 2020-08-20 PROCEDURE — 25010000002 ADENOSINE PER 6 MG: Performed by: STUDENT IN AN ORGANIZED HEALTH CARE EDUCATION/TRAINING PROGRAM

## 2020-08-20 RX ORDER — NALOXONE HCL 0.4 MG/ML
0.4 VIAL (ML) INJECTION
Status: DISCONTINUED | OUTPATIENT
Start: 2020-08-20 | End: 2020-08-21 | Stop reason: HOSPADM

## 2020-08-20 RX ORDER — ADENOSINE 3 MG/ML
INJECTION, SOLUTION INTRAVENOUS
Status: COMPLETED
Start: 2020-08-20 | End: 2020-08-20

## 2020-08-20 RX ORDER — BISACODYL 5 MG/1
5 TABLET, DELAYED RELEASE ORAL DAILY PRN
Status: DISCONTINUED | OUTPATIENT
Start: 2020-08-20 | End: 2020-08-21 | Stop reason: HOSPADM

## 2020-08-20 RX ORDER — ACETAMINOPHEN 160 MG/5ML
650 SOLUTION ORAL EVERY 4 HOURS PRN
Status: DISCONTINUED | OUTPATIENT
Start: 2020-08-20 | End: 2020-08-20 | Stop reason: SDUPTHER

## 2020-08-20 RX ORDER — DEXTROSE MONOHYDRATE 25 G/50ML
25 INJECTION, SOLUTION INTRAVENOUS
Status: DISCONTINUED | OUTPATIENT
Start: 2020-08-20 | End: 2020-08-21 | Stop reason: HOSPADM

## 2020-08-20 RX ORDER — FOLIC ACID 1 MG/1
1 TABLET ORAL DAILY
Status: DISCONTINUED | OUTPATIENT
Start: 2020-08-21 | End: 2020-08-21 | Stop reason: HOSPADM

## 2020-08-20 RX ORDER — ONDANSETRON 2 MG/ML
4 INJECTION INTRAMUSCULAR; INTRAVENOUS EVERY 6 HOURS PRN
Status: DISCONTINUED | OUTPATIENT
Start: 2020-08-20 | End: 2020-08-21 | Stop reason: HOSPADM

## 2020-08-20 RX ORDER — SODIUM CHLORIDE 9 MG/ML
INJECTION, SOLUTION INTRAVENOUS
Status: COMPLETED
Start: 2020-08-20 | End: 2020-08-20

## 2020-08-20 RX ORDER — ADENOSINE 3 MG/ML
12 INJECTION, SOLUTION INTRAVENOUS ONCE
Status: COMPLETED | OUTPATIENT
Start: 2020-08-20 | End: 2020-08-20

## 2020-08-20 RX ORDER — FAMOTIDINE 40 MG/1
40 TABLET, FILM COATED ORAL DAILY
Status: DISCONTINUED | OUTPATIENT
Start: 2020-08-21 | End: 2020-08-21 | Stop reason: HOSPADM

## 2020-08-20 RX ORDER — ADENOSINE 3 MG/ML
6 INJECTION, SOLUTION INTRAVENOUS ONCE
Status: COMPLETED | OUTPATIENT
Start: 2020-08-20 | End: 2020-08-20

## 2020-08-20 RX ORDER — ACETAMINOPHEN 650 MG/1
650 SUPPOSITORY RECTAL EVERY 4 HOURS PRN
Status: DISCONTINUED | OUTPATIENT
Start: 2020-08-20 | End: 2020-08-21 | Stop reason: HOSPADM

## 2020-08-20 RX ORDER — LORAZEPAM 0.5 MG/1
1 TABLET ORAL EVERY 8 HOURS
Status: DISCONTINUED | OUTPATIENT
Start: 2020-08-22 | End: 2020-08-21 | Stop reason: HOSPADM

## 2020-08-20 RX ORDER — ONDANSETRON 4 MG/1
4 TABLET, FILM COATED ORAL EVERY 6 HOURS PRN
Status: DISCONTINUED | OUTPATIENT
Start: 2020-08-20 | End: 2020-08-21 | Stop reason: HOSPADM

## 2020-08-20 RX ORDER — SODIUM CHLORIDE 0.9 % (FLUSH) 0.9 %
10 SYRINGE (ML) INJECTION EVERY 12 HOURS SCHEDULED
Status: DISCONTINUED | OUTPATIENT
Start: 2020-08-21 | End: 2020-08-21 | Stop reason: HOSPADM

## 2020-08-20 RX ORDER — ACETAMINOPHEN 325 MG/1
650 TABLET ORAL EVERY 4 HOURS PRN
Status: DISCONTINUED | OUTPATIENT
Start: 2020-08-20 | End: 2020-08-21 | Stop reason: HOSPADM

## 2020-08-20 RX ORDER — MULTIVITAMIN,THERAPEUTIC
1 TABLET ORAL DAILY
Status: DISCONTINUED | OUTPATIENT
Start: 2020-08-21 | End: 2020-08-21 | Stop reason: HOSPADM

## 2020-08-20 RX ORDER — LORAZEPAM 0.5 MG/1
1 TABLET ORAL EVERY 6 HOURS
Status: DISCONTINUED | OUTPATIENT
Start: 2020-08-21 | End: 2020-08-21 | Stop reason: HOSPADM

## 2020-08-20 RX ORDER — THIAMINE MONONITRATE (VIT B1) 100 MG
100 TABLET ORAL DAILY
Status: DISCONTINUED | OUTPATIENT
Start: 2020-08-21 | End: 2020-08-21 | Stop reason: HOSPADM

## 2020-08-20 RX ORDER — SODIUM CHLORIDE 0.9 % (FLUSH) 0.9 %
10 SYRINGE (ML) INJECTION AS NEEDED
Status: DISCONTINUED | OUTPATIENT
Start: 2020-08-20 | End: 2020-08-21 | Stop reason: HOSPADM

## 2020-08-20 RX ORDER — HYDROCODONE BITARTRATE AND ACETAMINOPHEN 5; 325 MG/1; MG/1
1 TABLET ORAL EVERY 4 HOURS PRN
Status: DISCONTINUED | OUTPATIENT
Start: 2020-08-20 | End: 2020-08-21 | Stop reason: HOSPADM

## 2020-08-20 RX ORDER — NICOTINE POLACRILEX 4 MG
15 LOZENGE BUCCAL
Status: DISCONTINUED | OUTPATIENT
Start: 2020-08-20 | End: 2020-08-21 | Stop reason: HOSPADM

## 2020-08-20 RX ADMIN — ADENOSINE 12 MG: 3 INJECTION, SOLUTION INTRAVENOUS at 18:52

## 2020-08-20 RX ADMIN — ADENOSINE 6 MG: 3 INJECTION, SOLUTION INTRAVENOUS at 18:48

## 2020-08-20 RX ADMIN — ENOXAPARIN SODIUM 40 MG: 40 INJECTION SUBCUTANEOUS at 23:46

## 2020-08-20 RX ADMIN — FOLIC ACID 1 MG: 1 TABLET ORAL at 23:46

## 2020-08-20 RX ADMIN — THERA TABS 1 TABLET: TAB at 23:46

## 2020-08-20 RX ADMIN — LORAZEPAM 1 MG: 0.5 TABLET ORAL at 23:46

## 2020-08-20 RX ADMIN — Medication 100 MG: at 23:46

## 2020-08-20 RX ADMIN — SODIUM CHLORIDE 1000 ML: 9 INJECTION, SOLUTION INTRAVENOUS at 19:53

## 2020-08-20 RX ADMIN — INSULIN DETEMIR 90 UNITS: 100 INJECTION, SOLUTION SUBCUTANEOUS at 23:30

## 2020-08-20 RX ADMIN — SODIUM CHLORIDE, PRESERVATIVE FREE 10 ML: 5 INJECTION INTRAVENOUS at 23:54

## 2020-08-21 VITALS
WEIGHT: 256.8 LBS | HEIGHT: 68 IN | OXYGEN SATURATION: 99 % | HEART RATE: 88 BPM | BODY MASS INDEX: 38.92 KG/M2 | RESPIRATION RATE: 18 BRPM | TEMPERATURE: 97.7 F | DIASTOLIC BLOOD PRESSURE: 96 MMHG | SYSTOLIC BLOOD PRESSURE: 144 MMHG

## 2020-08-21 PROBLEM — N17.9 AKI (ACUTE KIDNEY INJURY) (HCC): Status: ACTIVE | Noted: 2020-08-21

## 2020-08-21 LAB
ANION GAP SERPL CALCULATED.3IONS-SCNC: 11 MMOL/L (ref 5–15)
BUN SERPL-MCNC: 13 MG/DL (ref 6–20)
BUN/CREAT SERPL: 11.4 (ref 7–25)
CALCIUM SPEC-SCNC: 9.4 MG/DL (ref 8.6–10.5)
CHLORIDE SERPL-SCNC: 104 MMOL/L (ref 98–107)
CHOLEST SERPL-MCNC: 93 MG/DL (ref 0–200)
CO2 SERPL-SCNC: 26 MMOL/L (ref 22–29)
CREAT SERPL-MCNC: 1.14 MG/DL (ref 0.76–1.27)
DEPRECATED RDW RBC AUTO: 36.7 FL (ref 37–54)
ERYTHROCYTE [DISTWIDTH] IN BLOOD BY AUTOMATED COUNT: 12.3 % (ref 12.3–15.4)
GFR SERPL CREATININE-BSD FRML MDRD: 67 ML/MIN/1.73
GLUCOSE BLDC GLUCOMTR-MCNC: 196 MG/DL (ref 70–130)
GLUCOSE BLDC GLUCOMTR-MCNC: 369 MG/DL (ref 70–130)
GLUCOSE BLDC GLUCOMTR-MCNC: 388 MG/DL (ref 70–130)
GLUCOSE SERPL-MCNC: 215 MG/DL (ref 65–99)
HBA1C MFR BLD: 12.6 % (ref 4.8–5.6)
HCT VFR BLD AUTO: 44 % (ref 37.5–51)
HDLC SERPL-MCNC: 34 MG/DL (ref 40–60)
HGB BLD-MCNC: 15.6 G/DL (ref 13–17.7)
LDLC SERPL CALC-MCNC: 23 MG/DL (ref 0–100)
LDLC/HDLC SERPL: 0.69 {RATIO}
MAGNESIUM SERPL-MCNC: 1.7 MG/DL (ref 1.6–2.6)
MCH RBC QN AUTO: 29.9 PG (ref 26.6–33)
MCHC RBC AUTO-ENTMCNC: 35.5 G/DL (ref 31.5–35.7)
MCV RBC AUTO: 84.3 FL (ref 79–97)
PHOSPHATE SERPL-MCNC: 3.8 MG/DL (ref 2.5–4.5)
PLATELET # BLD AUTO: 68 10*3/MM3 (ref 140–450)
PMV BLD AUTO: 13.2 FL (ref 6–12)
POTASSIUM SERPL-SCNC: 3.6 MMOL/L (ref 3.5–5.2)
RBC # BLD AUTO: 5.22 10*6/MM3 (ref 4.14–5.8)
SODIUM SERPL-SCNC: 141 MMOL/L (ref 136–145)
TRIGL SERPL-MCNC: 178 MG/DL (ref 0–150)
TROPONIN T SERPL-MCNC: <0.01 NG/ML (ref 0–0.03)
VLDLC SERPL-MCNC: 35.6 MG/DL
WBC # BLD AUTO: 8.22 10*3/MM3 (ref 3.4–10.8)

## 2020-08-21 PROCEDURE — G0378 HOSPITAL OBSERVATION PER HR: HCPCS

## 2020-08-21 PROCEDURE — 82962 GLUCOSE BLOOD TEST: CPT

## 2020-08-21 PROCEDURE — 85027 COMPLETE CBC AUTOMATED: CPT | Performed by: HOSPITALIST

## 2020-08-21 PROCEDURE — 84100 ASSAY OF PHOSPHORUS: CPT | Performed by: HOSPITALIST

## 2020-08-21 PROCEDURE — 83036 HEMOGLOBIN GLYCOSYLATED A1C: CPT | Performed by: HOSPITALIST

## 2020-08-21 PROCEDURE — 80061 LIPID PANEL: CPT | Performed by: HOSPITALIST

## 2020-08-21 PROCEDURE — 63710000001 INSULIN ASPART PER 5 UNITS: Performed by: HOSPITALIST

## 2020-08-21 PROCEDURE — 80048 BASIC METABOLIC PNL TOTAL CA: CPT | Performed by: HOSPITALIST

## 2020-08-21 PROCEDURE — 84484 ASSAY OF TROPONIN QUANT: CPT | Performed by: HOSPITALIST

## 2020-08-21 PROCEDURE — 83735 ASSAY OF MAGNESIUM: CPT | Performed by: HOSPITALIST

## 2020-08-21 RX ADMIN — INSULIN ASPART 2 UNITS: 100 INJECTION, SOLUTION INTRAVENOUS; SUBCUTANEOUS at 08:23

## 2020-08-21 RX ADMIN — FAMOTIDINE 40 MG: 40 TABLET, FILM COATED ORAL at 08:23

## 2020-08-21 RX ADMIN — Medication 10 ML: at 00:02

## 2020-08-21 RX ADMIN — LORAZEPAM 1 MG: 0.5 TABLET ORAL at 05:34

## 2020-08-21 NOTE — H&P
.        [facility] ER Medicine Admission      Date of Admission: 8/20/2020  Patient was seen and evaluated approximately 11 PM on 8/20/2020 however this note was written after midnight    Primary Care Physician: Willard House MD      Chief Complaint: Dizziness and fast heart rate**    HPI:*Patient is a 54-year-old male who presents to the [facility] emergency room with dizziness and lightheadedness and a fast heart rate.  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  He then felt fluttering in his chest and then a chest tightness.  He reports having 6 months of palpitations but he thought it was just his nerves are that he got over excited he would just sit down to calm down and then it would go away.  He has no prior history of any heart disease.  He does report having some near syncope during the episode with reports of palpitations and during the palpitations a tight sensation in his chest that he has never had before.  Denies headache blurry vision nausea vomiting diarrhea change in bowel or bladder habits.    Concurrent Medical History:  has a past medical history of Diabetes mellitus (CMS/HCC), Elevated liver enzymes, Elevated liver enzymes, Gall stones, GERD (gastroesophageal reflux disease), GERD (gastroesophageal reflux disease), Hypertension, LINDSEY (nonalcoholic steatohepatitis), and LINDSEY (nonalcoholic steatohepatitis).    Past Surgical History:  has a past surgical history that includes Shoulder surgery; Skin graft; Gastric bypass; and Gallbladder surgery.    Family History: family history includes Autism in his son; Cancer in his brother, paternal aunt, and paternal grandmother; Diabetes in his paternal aunt; Heart disease in his father and paternal grandfather; Hypertension in his father and mother; No Known Problems in his sister and sister. **    Social History:  reports that he has never  smoked. He quit smokeless tobacco use about 20 years ago. He reports that he drinks about 15.0 standard drinks of alcohol per week. He reports that he does not use drugs.    Allergies: No Known Allergies    Medications:   Prior to Admission medications    Medication Sig Start Date End Date Taking? Authorizing Provider   atorvastatin (LIPITOR) 40 MG tablet Take 40 mg by mouth Every Night.   Yes Dmitriy Soto MD   Cholecalciferol (VITAMIN D3) 2000 units tablet Take  by mouth Every Night.   Yes Dmitriy Soto MD   folic acid (FOLVITE) 1 MG tablet Take 1 mg by mouth Every Night.   Yes Dmitriy Soto MD   glipiZIDE (GLUCOTROL) 5 MG tablet Take 5 mg by mouth Every Night.   Yes Dmitriy Soto MD   insulin glargine (LANTUS) 100 UNIT/ML injection Inject 90 Units under the skin into the appropriate area as directed Every Night.   Yes Dmitriy Soto MD   omeprazole (priLOSEC) 20 MG capsule Take 20 mg by mouth Every Night.   Yes Dmitriy Soto MD   albuterol sulfate  (90 Base) MCG/ACT inhaler Inhale 2 puffs Every 4 (Four) Hours As Needed for Wheezing.    Dmitriy Soto MD   azithromycin (ZITHROMAX) 500 MG tablet Take 500 mg by mouth Take As Directed.    Dmitriy Soto MD   Cyanocobalamin 1000 MCG/ML kit Inject  as directed Every 30 (Thirty) Days.    Dmitriy Soto MD   LISINOPRIL PO Take 10 mg by mouth Daily.    Dmitriy Soto MD       Review of Systems:  Review of Systems   Point review of systems obtained pertinent positives and negatives noted above in HPI otherwise complete ROS is negative except as mentioned above.    Physical Exam:   Temp:  [96.8 °F (36 °C)-97.5 °F (36.4 °C)] 97.5 °F (36.4 °C)  Heart Rate:  [] 75  Resp:  [18-20] 18  BP: (123-154)/(81-91) 139/81  Physical Exam   Constitutional: He is oriented to person, place, and time. He appears well-developed and well-nourished. No distress.   HENT:   Head: Normocephalic and atraumatic.    Right Ear: External ear normal.   Left Ear: External ear normal.   Nose: Nose normal.   Eyes: Conjunctivae and EOM are normal. Right eye exhibits no discharge. Left eye exhibits discharge.   Neck: Neck supple. No JVD present.   Cardiovascular: Normal rate, regular rhythm, normal heart sounds and intact distal pulses. Exam reveals no gallop and no friction rub.   No murmur heard.  Pulmonary/Chest: Effort normal and breath sounds normal. No respiratory distress. He has no wheezes. He has no rales.   Abdominal: Soft. Bowel sounds are normal.   Musculoskeletal: He exhibits no edema or deformity.   Neurological: He is alert and oriented to person, place, and time. No cranial nerve deficit.   Skin: Skin is warm and dry. Capillary refill takes less than 2 seconds. He is not diaphoretic.   Psychiatric: He has a normal mood and affect. His behavior is normal. Judgment and thought content normal.         Results Reviewed:  I have personally reviewed current lab, radiology, and data and agree with results.  Lab Results (last 24 hours)     Procedure Component Value Units Date/Time    Scan Slide [[id number]] Collected:  08/20/20 1854    Specimen:  Blood Updated:  08/20/20 2044     RBC Morphology Normal     WBC Morphology Normal     Platelet Estimate Decreased    [place] Draw [[id number]] Collected:  08/20/20 1854    Specimen:  Blood Updated:  08/20/20 2000    Narrative:       The following orders were created for panel order [place] Draw.  Procedure                               Abnormality         Status                     ---------                               -----------         ------                     Light Blue Top[[id number]]                                   Final result               Green Top (Gel)[[id number]]                                  Final result               Lavender Top[[id number]]                                     Final result               Gold Top - [facility][[id number]]                                    Final result                 Please view results for these tests on the individual orders.    Light Blue Top [[id number]] Collected:  08/20/20 1854    Specimen:  Blood Updated:  08/20/20 2000     Extra Tube hold for add-on     Comment: Auto resulted       Green Top (Gel) [[id number]] Collected:  08/20/20 1854    Specimen:  Blood Updated:  08/20/20 2000     Extra Tube Hold for add-ons.     Comment: Auto resulted.       Lavender Top [[id number]] Collected:  08/20/20 1854    Specimen:  Blood Updated:  08/20/20 2000     Extra Tube hold for add-on     Comment: Auto resulted       Gold Top - SST [[id number]] Collected:  08/20/20 1854    Specimen:  Blood Updated:  08/20/20 2000     Extra Tube Hold for add-ons.     Comment: Auto resulted.       Comprehensive Metabolic Panel [[id number]]  (Abnormal) Collected:  08/20/20 1854    Specimen:  Blood Updated:  08/20/20 1931     Glucose 529 mg/dL      BUN 13 mg/dL      Creatinine 1.53 mg/dL      Sodium 133 mmol/L      Potassium 4.3 mmol/L      Comment: Specimen hemolyzed.  Results may be affected.        Chloride 93 mmol/L      CO2 21.0 mmol/L      Calcium 9.9 mg/dL      Total Protein 7.5 g/dL      Albumin 4.30 g/dL      ALT (SGPT) 44 U/L      AST (SGOT) 49 U/L      Alkaline Phosphatase 147 U/L      Total Bilirubin 2.1 mg/dL      eGFR Non African Amer 48 mL/min/1.73      Globulin 3.2 gm/dL      A/G Ratio 1.3 g/dL      BUN/Creatinine Ratio 8.5     Anion Gap 19.0 mmol/L     Narrative:       GFR Normal >60  Chronic Kidney Disease <60  Kidney Failure <15      Magnesium [[id number]]  (Normal) Collected:  08/20/20 1854    Specimen:  Blood Updated:  08/20/20 1928     Magnesium 1.8 mg/dL     Troponin [[id number]]  (Normal) Collected:  08/20/20 1854    Specimen:  Blood Updated:  08/20/20 1927     Troponin T <0.010 ng/mL     Narrative:       Troponin T Reference Range:  <= 0.03 ng/mL-   Negative for AMI  >0.03 ng/mL-     Abnormal for myocardial necrosis.  Clinicians would have to utilize clinical  acumen, EKG, Troponin and serial changes to determine if it is an Acute Myocardial Infarction or myocardial injury due to an underlying chronic condition.       Results may be falsely decreased if patient taking Biotin.      BNP [[id number]]  (Normal) Collected:  08/20/20 1854    Specimen:  Blood Updated:  08/20/20 1927     proBNP 58.1 pg/mL     Narrative:       Among patients with dyspnea, NT-proBNP is highly sensitive for the detection of acute congestive heart failure. In addition NT-proBNP of <300 pg/ml effectively rules out acute congestive heart failure with 99% negative predictive value.    Results may be falsely decreased if patient taking Biotin.      Urinalysis, Microscopic Only - Urine, Clean Catch [[id number]]  (Abnormal) Collected:  08/20/20 1911    Specimen:  Urine, Clean Catch Updated:  08/20/20 1922     RBC, UA 0-2 /HPF      WBC, UA 0-2 /HPF      Bacteria, UA None Seen /HPF      Squamous Epithelial Cells, UA None Seen /HPF      Hyaline Casts, UA 3-6 /LPF      Methodology Automated Microscopy    Urinalysis With Microscopic If Indicated (No Culture) - Urine, Clean Catch [[id number]]  (Abnormal) Collected:  08/20/20 1911    Specimen:  Urine, Clean Catch Updated:  08/20/20 1922     Color, UA Yellow     Appearance, UA Clear     pH, UA 5.5     Specific Gravity, UA 1.037     Comment: Result obtained by Refractometer        Glucose, UA >=1000 mg/dL (3+)     Ketones, UA Negative     Bilirubin, UA Negative     Blood, UA Negative     Protein, UA 30 mg/dL (1+)     Leuk Esterase, UA Negative     Nitrite, UA Negative     Urobilinogen, UA 0.2 E.U./dL    CBC & Differential [[id number]] Collected:  08/20/20 1854    Specimen:  Blood Updated:  08/20/20 1922    Narrative:       The following orders were created for panel order CBC & Differential.  Procedure                               Abnormality         Status                     ---------                               -----------         ------                     CBC  Auto Differential[[id number]]        Abnormal            Final result                 Please view results for these tests on the individual orders.    CBC Auto Differential [[id number]]  (Abnormal) Collected:  08/20/20 1854    Specimen:  Blood Updated:  08/20/20 1922     WBC 13.03 10*3/mm3      RBC 5.92 10*6/mm3      Hemoglobin 17.7 g/dL      Hematocrit 49.5 %      MCV 83.6 fL      MCH 29.9 pg      MCHC 35.8 g/dL      RDW 12.1 %      RDW-SD 36.8 fl      MPV 13.7 fL      Platelets 106 10*3/mm3      Neutrophil % 76.0 %      Lymphocyte % 14.1 %      Monocyte % 7.7 %      Eosinophil % 1.0 %      Basophil % 0.9 %      Immature Grans % 0.3 %      Neutrophils, Absolute 9.90 10*3/mm3      Lymphocytes, Absolute 1.84 10*3/mm3      Monocytes, Absolute 1.00 10*3/mm3      Eosinophils, Absolute 0.13 10*3/mm3      Basophils, Absolute 0.12 10*3/mm3      Immature Grans, Absolute 0.04 10*3/mm3      nRBC 0.0 /100 WBC         Imaging Results (Last 24 Hours)     Procedure Component Value Units Date/Time    XR Chest 1 View [[id number]] Collected:  08/20/20 1946     Updated:  08/20/20 2000    Narrative:         PORTABLE CHEST    HISTORY: Supraventricular tachycardia.    Portable AP upright film of the chest was obtained at 7:36 PM.  COMPARISON: None    FINDINGS:   EKG leads.  The lungs are clear of an acute process.  The heart is not enlarged.  The pulmonary vasculature is not increased.  No pleural effusion.  No pneumothorax.  No acute osseous abnormality.  Old left rib fractures.      Impression:       CONCLUSION:  No Acute Disease    [id number]    Electronically signed by:  Steve Bass MD  8/20/2020 7:58 PM CDT  Workstation: [id number]            Assessment:    Active Hospital Problems    Diagnosis   • SVT (supraventricular tachycardia) (CMS/[facility])             Plan:*  SVT  Was given 6 mg and then 12 mg of adenosine in the ER.  He has not had any further runs of SVT.  Depending on how patient does he may or may not need cardiology consultation may  or may not need initiation of medications.  This is his first episode of this but has had palpitations for the past 6 months.  May benefit from Holter monitor      Diabetes with hyperglycemia  Anion gap metabolic acidosis  Has been given insulin but will also re check glucose levels.  No ketones evident in the urine at this time.  Sliding scale insulin with Accu-Cheks    Acute renal insufficiency  We will give gentle fluids and repeat creatinine in a.m.      History of alcohol use  Patient drinks 3 sixpacks of beer per week per his report.  Placed on alcohol withdrawal protocol      DVT prophylaxis Lovenox    Full code      I discussed the patient's findings and my recommendations with: Patient    Kevan Wang MD

## 2020-08-21 NOTE — ED PROVIDER NOTES
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  He felt his heart racing.  He has had trouble over the last several months with palpitations, but has not discussed it with any physician yet.      Palpitations   Palpitations quality:  Regular  Onset quality:  Sudden  Duration:  1 hour  Timing:  Constant  Progression:  Unchanged  Chronicity:  New  Context: dehydration    Context: not anxiety, not blood loss and not hyperventilation    Relieved by:  Nothing  Worsened by:  Nothing  Ineffective treatments:  None tried  Associated symptoms: dizziness    Associated symptoms: no chest pain, no cough, no diaphoresis, no nausea, no numbness, no shortness of breath and no weakness    Risk factors: no heart disease        Review of Systems   Constitutional: Positive for activity change. Negative for appetite change, chills, diaphoresis, fatigue and fever.   HENT: Negative for congestion and rhinorrhea.    Respiratory: Positive for chest tightness. Negative for cough, shortness of breath and wheezing.    Cardiovascular: Positive for palpitations. Negative for chest pain and leg swelling.   Gastrointestinal: Negative for abdominal pain, diarrhea and nausea.   Genitourinary: Negative for dysuria and flank pain.   Skin: Negative for color change and rash.   Neurological: Positive for dizziness and light-headedness. Negative for syncope, weakness, numbness and headaches.   Psychiatric/Behavioral: Negative for agitation. The patient is not nervous/anxious.        Past Medical History:   Diagnosis Date   • Diabetes mellitus (CMS/HCC)    • Elevated liver enzymes    • Elevated liver enzymes    • Gall stones    • GERD (gastroesophageal reflux disease)    • GERD (gastroesophageal reflux disease)    • Hypertension    • LINDSEY (nonalcoholic steatohepatitis)    • LINDSEY (nonalcoholic steatohepatitis)        No Known Allergies    Past Surgical History:   Procedure Laterality Date   • GALLBLADDER SURGERY    "  • GASTRIC BYPASS     • SHOULDER SURGERY     • SKIN GRAFT         Family History   Problem Relation Age of Onset   • Hypertension Mother    • Heart disease Father    • Hypertension Father    • Cancer Brother    • Cancer Paternal Aunt    • Diabetes Paternal Aunt    • Cancer Paternal Grandmother    • Heart disease Paternal Grandfather    • No Known Problems Sister    • No Known Problems Sister    • Autism Son        Social History     Socioeconomic History   • Marital status:      Spouse name: Not on file   • Number of children: Not on file   • Years of education: Not on file   • Highest education level: Not on file   Tobacco Use   • Smoking status: Never Smoker   • Smokeless tobacco: Former User   Substance and Sexual Activity   • Alcohol use: Yes     Alcohol/week: 15.0 standard drinks     Types: 15 Cans of beer per week     Frequency: 2-3 times a week   • Drug use: No   • Sexual activity: Defer           Objective    Vitals:    08/20/20 1956 08/20/20 2001 08/20/20 2031 08/20/20 2145   BP:  145/88 139/88 154/85   BP Location:    Right arm   Patient Position:    Lying   Pulse:  106 106 102   Resp:    20   Temp:    96.8 °F (36 °C)   TempSrc:    Oral   SpO2:  99% 99% 97%   Weight: 116 kg (255 lb)   118 kg (260 lb 6.4 oz)   Height: 172.7 cm (68\")   172.7 cm (68\")       Physical Exam   Constitutional: He is oriented to person, place, and time. He appears well-developed and well-nourished. He is active.  Non-toxic appearance. He does not have a sickly appearance. He does not appear ill. He appears distressed.   HENT:   Head: Normocephalic.   Right Ear: External ear normal.   Left Ear: External ear normal.   Mouth/Throat: Mucous membranes are normal.   Eyes: Conjunctivae are normal.   Cardiovascular: Regular rhythm, normal heart sounds and intact distal pulses. Tachycardia present.   Pulmonary/Chest: Effort normal. No accessory muscle usage. No respiratory distress. He has no decreased breath sounds. He has no " wheezes. He exhibits no tenderness.   Abdominal: Soft. Bowel sounds are normal. There is no tenderness (deep palpation). There is no rigidity.   Neurological: He is alert and oriented to person, place, and time. He is not disoriented. No cranial nerve deficit (grossly intact). GCS eye subscore is 4. GCS verbal subscore is 5. GCS motor subscore is 6.   Skin: Skin is warm and dry. Capillary refill takes less than 2 seconds. He is not diaphoretic.   Psychiatric: He has a normal mood and affect. His behavior is normal.   Nursing note and vitals reviewed.      ECG 12 Lead    Date/Time: 8/20/2020 10:11 PM  Performed by: Juan Carlos Ng MD  Authorized by: Juan Carlos Ng MD   Interpreted by physician  Rhythm: SVT  Rate: tachycardic  Clinical impression: abnormal ECG      ECG 12 Lead    Date/Time: 8/20/2020 10:12 PM  Performed by: Juan Carlos Ng MD  Authorized by: Juan Carlos Ng MD   Interpreted by physician  Rhythm: sinus tachycardia  Rate: tachycardic  ST Segments: ST segments normal  T Waves: T waves normal                   ED Course      Results for orders placed or performed during the hospital encounter of 08/20/20   Comprehensive Metabolic Panel   Result Value Ref Range    Glucose 529 (C) 65 - 99 mg/dL    BUN 13 6 - 20 mg/dL    Creatinine 1.53 (H) 0.76 - 1.27 mg/dL    Sodium 133 (L) 136 - 145 mmol/L    Potassium 4.3 3.5 - 5.2 mmol/L    Chloride 93 (L) 98 - 107 mmol/L    CO2 21.0 (L) 22.0 - 29.0 mmol/L    Calcium 9.9 8.6 - 10.5 mg/dL    Total Protein 7.5 6.0 - 8.5 g/dL    Albumin 4.30 3.50 - 5.20 g/dL    ALT (SGPT) 44 (H) 1 - 41 U/L    AST (SGOT) 49 (H) 1 - 40 U/L    Alkaline Phosphatase 147 (H) 39 - 117 U/L    Total Bilirubin 2.1 (H) 0.0 - 1.2 mg/dL    eGFR Non African Amer 48 (L) >60 mL/min/1.73    Globulin 3.2 gm/dL    A/G Ratio 1.3 g/dL    BUN/Creatinine Ratio 8.5 7.0 - 25.0    Anion Gap 19.0 (H) 5.0 - 15.0 mmol/L   Urinalysis With Microscopic If Indicated (No Culture) - Urine, Clean Catch   Result  Value Ref Range    Color, UA Yellow Yellow, Straw, Dark Yellow, Oralia    Appearance, UA Clear Clear    pH, UA 5.5 5.0 - 9.0    Specific Gravity, UA 1.037 (H) 1.003 - 1.030    Glucose, UA >=1000 mg/dL (3+) (A) Negative    Ketones, UA Negative Negative    Bilirubin, UA Negative Negative    Blood, UA Negative Negative    Protein, UA 30 mg/dL (1+) (A) Negative    Leuk Esterase, UA Negative Negative    Nitrite, UA Negative Negative    Urobilinogen, UA 0.2 E.U./dL 0.2 - 1.0 E.U./dL   BNP   Result Value Ref Range    proBNP 58.1 0.0 - 900.0 pg/mL   Troponin   Result Value Ref Range    Troponin T <0.010 0.000 - 0.030 ng/mL   Magnesium   Result Value Ref Range    Magnesium 1.8 1.6 - 2.6 mg/dL   CBC Auto Differential   Result Value Ref Range    WBC 13.03 (H) 3.40 - 10.80 10*3/mm3    RBC 5.92 (H) 4.14 - 5.80 10*6/mm3    Hemoglobin 17.7 13.0 - 17.7 g/dL    Hematocrit 49.5 37.5 - 51.0 %    MCV 83.6 79.0 - 97.0 fL    MCH 29.9 26.6 - 33.0 pg    MCHC 35.8 (H) 31.5 - 35.7 g/dL    RDW 12.1 (L) 12.3 - 15.4 %    RDW-SD 36.8 (L) 37.0 - 54.0 fl    MPV 13.7 (H) 6.0 - 12.0 fL    Platelets 106 (L) 140 - 450 10*3/mm3    Neutrophil % 76.0 42.7 - 76.0 %    Lymphocyte % 14.1 (L) 19.6 - 45.3 %    Monocyte % 7.7 5.0 - 12.0 %    Eosinophil % 1.0 0.3 - 6.2 %    Basophil % 0.9 0.0 - 1.5 %    Immature Grans % 0.3 0.0 - 0.5 %    Neutrophils, Absolute 9.90 (H) 1.70 - 7.00 10*3/mm3    Lymphocytes, Absolute 1.84 0.70 - 3.10 10*3/mm3    Monocytes, Absolute 1.00 (H) 0.10 - 0.90 10*3/mm3    Eosinophils, Absolute 0.13 0.00 - 0.40 10*3/mm3    Basophils, Absolute 0.12 0.00 - 0.20 10*3/mm3    Immature Grans, Absolute 0.04 0.00 - 0.05 10*3/mm3    nRBC 0.0 0.0 - 0.2 /100 WBC   Scan Slide   Result Value Ref Range    RBC Morphology Normal Normal    WBC Morphology Normal Normal    Platelet Estimate Decreased Normal   Urinalysis, Microscopic Only - Urine, Clean Catch   Result Value Ref Range    RBC, UA 0-2 (A) None Seen /HPF    WBC, UA 0-2 None Seen, 0-2, 3-5 /HPF     Bacteria, UA None Seen None Seen /HPF    Squamous Epithelial Cells, UA None Seen None Seen, 0-2 /HPF    Hyaline Casts, UA 3-6 None Seen /LPF    Methodology Automated Microscopy    Light Blue Top   Result Value Ref Range    Extra Tube hold for add-on    Green Top (Gel)   Result Value Ref Range    Extra Tube Hold for add-ons.    Lavender Top   Result Value Ref Range    Extra Tube hold for add-on    Gold Top - SST   Result Value Ref Range    Extra Tube Hold for add-ons.      XR Chest 1 View   Final Result   CONCLUSION:   No Acute Disease      [id number]      Electronically signed by:  Steve Bass MD  8/20/2020 7:58 PM CDT   Workstation: [id number]                                           MDM  Number of Diagnoses or Management Options  REESE (acute kidney injury) (CMS/HCC): new and requires workup  SVT (supraventricular tachycardia) (CMS/HCC): new and requires workup  Diagnosis management comments: Patient placed in bed 14 and evaluated by me. Vital signs are stable, afebrile.  Initial EKG showed SVT with a heart rate in the 190s.  Patient converted to normal sinus with a heart rate in the 110s after 6 and 12 of adenosine.  Labs obtained remarkable for a glucose of 500, REESE creatinine 1.5.  Troponin and BNP negative.  Chest x-ray showed no acute cardiopulmonary processes.  Repeat EKG showed sinus tachycardia.  IVF bolus was administered.  I spoke with the on-call hospitalist who agreed to admit for further evaluation and treatment.       Amount and/or Complexity of Data Reviewed  Clinical lab tests: reviewed and ordered  Tests in the radiology section of CPT®: ordered and reviewed  Tests in the medicine section of CPT®: ordered and reviewed  Decide to obtain previous medical records or to obtain history from someone other than the patient: yes  Discuss the patient with other providers: yes    Patient Progress  Patient progress: improved      Final diagnoses:   SVT (supraventricular tachycardia) (CMS/HCC)   REESE (acute kidney  injury) (CMS/[facility])            Juan Carlos Ng MD  08/20/20 7308

## 2020-08-21 NOTE — DISCHARGE SUMMARY
[facility] Medicine Services  DISCHARGE SUMMARY       Date of Admission: 8/20/2020  Date of Discharge:  8/21/2020  Primary Care Physician: Willard House MD    Presenting Problem/History of Present Illness:  SVT (supraventricular tachycardia) (CMS/[facility]) [I47.1]  REESE (acute kidney injury) (CMS/[facility]) [N17.9]     Final Discharge Diagnoses:  Active Hospital Problems    Diagnosis   • REESE (acute kidney injury) (CMS/[facility])   • SVT (supraventricular tachycardia) (CMS/[facility])       Consults:   Consults     No orders found for last 30 day(s).          Procedures Performed:                 Pertinent Test Results:   Lab Results (most recent)     Procedure Component Value Units Date/Time    CBC (No Diff) [[id number]]  (Abnormal) Collected:  08/21/20 0750    Specimen:  Blood Updated:  08/21/20 0819     WBC 8.22 10*3/mm3      RBC 5.22 10*6/mm3      Hemoglobin 15.6 g/dL      Hematocrit 44.0 %      MCV 84.3 fL      MCH 29.9 pg      MCHC 35.5 g/dL      RDW 12.3 %      RDW-SD 36.7 fl      MPV 13.2 fL      Platelets 68 10*3/mm3      Comment: Results confirmed by recollection       Basic Metabolic Panel [[id number]]  (Abnormal) Collected:  08/21/20 0615    Specimen:  Blood Updated:  08/21/20 0726     Glucose 215 mg/dL      BUN 13 mg/dL      Creatinine 1.14 mg/dL      Sodium 141 mmol/L      Potassium 3.6 mmol/L      Chloride 104 mmol/L      CO2 26.0 mmol/L      Calcium 9.4 mg/dL      eGFR Non African Amer 67 mL/min/1.73      BUN/Creatinine Ratio 11.4     Anion Gap 11.0 mmol/L     Narrative:       GFR Normal >60  Chronic Kidney Disease <60  Kidney Failure <15      Phosphorus [[id number]]  (Normal) Collected:  08/21/20 0615    Specimen:  Blood Updated:  08/21/20 0721     Phosphorus 3.8 mg/dL     Lipid Panel [[id number]]  (Abnormal) Collected:  08/21/20 0615    Specimen:  Blood Updated:  08/21/20 0721     Total Cholesterol 93 mg/dL      Triglycerides 178 mg/dL      HDL Cholesterol 34 mg/dL      LDL  Cholesterol  23 mg/dL      VLDL Cholesterol 35.6 mg/dL      LDL/HDL Ratio 0.69    Narrative:       Cholesterol Reference Ranges  (U.S. Department of Health and Human Services ATP III Classifications)    Desirable          <200 mg/dL  Borderline High    200-239 mg/dL  High Risk          >240 mg/dL      Triglyceride Reference Ranges  (U.S. Department of Health and Human Services ATP III Classifications)    Normal           <150 mg/dL  Borderline High  150-199 mg/dL  High             200-499 mg/dL  Very High        >500 mg/dL    HDL Reference Ranges  (U.S. Department of Health and Human Services ATP III Classifcations)    Low     <40 mg/dl (major risk factor for CHD)  High    >60 mg/dl ('negative' risk factor for CHD)        LDL Reference Ranges  (U.S. Department of Health and Human Services ATP III Classifcations)    Optimal          <100 mg/dL  Near Optimal     100-129 mg/dL  Borderline High  130-159 mg/dL  High             160-189 mg/dL  Very High        >189 mg/dL    Magnesium [[id number]]  (Normal) Collected:  08/21/20 0615    Specimen:  Blood Updated:  08/21/20 0721     Magnesium 1.7 mg/dL     Troponin [[id number]]  (Normal) Collected:  08/21/20 0615    Specimen:  Blood Updated:  08/21/20 0718     Troponin T <0.010 ng/mL     Narrative:       Troponin T Reference Range:  <= 0.03 ng/mL-   Negative for AMI  >0.03 ng/mL-     Abnormal for myocardial necrosis.  Clinicians would have to utilize clinical acumen, EKG, Troponin and serial changes to determine if it is an Acute Myocardial Infarction or myocardial injury due to an underlying chronic condition.       Results may be falsely decreased if patient taking Biotin.      Hemoglobin A1c [[id number]]  (Abnormal) Collected:  08/21/20 0615    Specimen:  Blood Updated:  08/21/20 0717     Hemoglobin A1C 12.60 %     Narrative:       Hemoglobin A1C Ranges:    Increased Risk for Diabetes  5.7% to 6.4%  Diabetes                     >= 6.5%  Diabetic Goal                < 7.0%     Scan Slide [[id number]] Collected:  08/20/20 1854    Specimen:  Blood Updated:  08/20/20 2044     RBC Morphology Normal     WBC Morphology Normal     Platelet Estimate Decreased    [place] Draw [[id number]] Collected:  08/20/20 1854    Specimen:  Blood Updated:  08/20/20 2000    Narrative:       The following orders were created for panel order [place] Draw.  Procedure                               Abnormality         Status                     ---------                               -----------         ------                     Light Blue Top[[id number]]                                   Final result               Green Top (Gel)[[id number]]                                  Final result               Lavender Top[[id number]]                                     Final result               Gold Top - SST[[id number]]                                   Final result                 Please view results for these tests on the individual orders.    Light Blue Top [[id number]] Collected:  08/20/20 1854    Specimen:  Blood Updated:  08/20/20 2000     Extra Tube hold for add-on     Comment: Auto resulted       Green Top (Gel) [[id number]] Collected:  08/20/20 1854    Specimen:  Blood Updated:  08/20/20 2000     Extra Tube Hold for add-ons.     Comment: Auto resulted.       Lavender Top [[id number]] Collected:  08/20/20 1854    Specimen:  Blood Updated:  08/20/20 2000     Extra Tube hold for add-on     Comment: Auto resulted       Gold Top - SST [[id number]] Collected:  08/20/20 1854    Specimen:  Blood Updated:  08/20/20 2000     Extra Tube Hold for add-ons.     Comment: Auto resulted.       Comprehensive Metabolic Panel [[id number]]  (Abnormal) Collected:  08/20/20 1854    Specimen:  Blood Updated:  08/20/20 1931     Glucose 529 mg/dL      BUN 13 mg/dL      Creatinine 1.53 mg/dL      Sodium 133 mmol/L      Potassium 4.3 mmol/L      Comment: Specimen hemolyzed.  Results may be affected.        Chloride 93 mmol/L      CO2 21.0 mmol/L       Calcium 9.9 mg/dL      Total Protein 7.5 g/dL      Albumin 4.30 g/dL      ALT (SGPT) 44 U/L      AST (SGOT) 49 U/L      Alkaline Phosphatase 147 U/L      Total Bilirubin 2.1 mg/dL      eGFR Non African Amer 48 mL/min/1.73      Globulin 3.2 gm/dL      A/G Ratio 1.3 g/dL      BUN/Creatinine Ratio 8.5     Anion Gap 19.0 mmol/L     Narrative:       GFR Normal >60  Chronic Kidney Disease <60  Kidney Failure <15      Magnesium [[id number]]  (Normal) Collected:  08/20/20 1854    Specimen:  Blood Updated:  08/20/20 1928     Magnesium 1.8 mg/dL     Troponin [[id number]]  (Normal) Collected:  08/20/20 1854    Specimen:  Blood Updated:  08/20/20 1927     Troponin T <0.010 ng/mL     Narrative:       Troponin T Reference Range:  <= 0.03 ng/mL-   Negative for AMI  >0.03 ng/mL-     Abnormal for myocardial necrosis.  Clinicians would have to utilize clinical acumen, EKG, Troponin and serial changes to determine if it is an Acute Myocardial Infarction or myocardial injury due to an underlying chronic condition.       Results may be falsely decreased if patient taking Biotin.      BNP [[id number]]  (Normal) Collected:  08/20/20 1854    Specimen:  Blood Updated:  08/20/20 1927     proBNP 58.1 pg/mL     Narrative:       Among patients with dyspnea, NT-proBNP is highly sensitive for the detection of acute congestive heart failure. In addition NT-proBNP of <300 pg/ml effectively rules out acute congestive heart failure with 99% negative predictive value.    Results may be falsely decreased if patient taking Biotin.      Urinalysis, Microscopic Only - Urine, Clean Catch [[id number]]  (Abnormal) Collected:  08/20/20 1911    Specimen:  Urine, Clean Catch Updated:  08/20/20 1922     RBC, UA 0-2 /HPF      WBC, UA 0-2 /HPF      Bacteria, UA None Seen /HPF      Squamous Epithelial Cells, UA None Seen /HPF      Hyaline Casts, UA 3-6 /LPF      Methodology Automated Microscopy    Urinalysis With Microscopic If Indicated (No Culture) - Urine, Clean  Catch [[id number]]  (Abnormal) Collected:  08/20/20 1911    Specimen:  Urine, Clean Catch Updated:  08/20/20 1922     Color, UA Yellow     Appearance, UA Clear     pH, UA 5.5     Specific Gravity, UA 1.037     Comment: Result obtained by Refractometer        Glucose, UA >=1000 mg/dL (3+)     Ketones, UA Negative     Bilirubin, UA Negative     Blood, UA Negative     Protein, UA 30 mg/dL (1+)     Leuk Esterase, UA Negative     Nitrite, UA Negative     Urobilinogen, UA 0.2 E.U./dL    CBC & Differential [[id number]] Collected:  08/20/20 1854    Specimen:  Blood Updated:  08/20/20 1922    Narrative:       The following orders were created for panel order CBC & Differential.  Procedure                               Abnormality         Status                     ---------                               -----------         ------                     CBC Auto Differential[[id number]]        Abnormal            Final result                 Please view results for these tests on the individual orders.    CBC Auto Differential [[id number]]  (Abnormal) Collected:  08/20/20 1854    Specimen:  Blood Updated:  08/20/20 1922     WBC 13.03 10*3/mm3      RBC 5.92 10*6/mm3      Hemoglobin 17.7 g/dL      Hematocrit 49.5 %      MCV 83.6 fL      MCH 29.9 pg      MCHC 35.8 g/dL      RDW 12.1 %      RDW-SD 36.8 fl      MPV 13.7 fL      Platelets 106 10*3/mm3      Neutrophil % 76.0 %      Lymphocyte % 14.1 %      Monocyte % 7.7 %      Eosinophil % 1.0 %      Basophil % 0.9 %      Immature Grans % 0.3 %      Neutrophils, Absolute 9.90 10*3/mm3      Lymphocytes, Absolute 1.84 10*3/mm3      Monocytes, Absolute 1.00 10*3/mm3      Eosinophils, Absolute 0.13 10*3/mm3      Basophils, Absolute 0.12 10*3/mm3      Immature Grans, Absolute 0.04 10*3/mm3      nRBC 0.0 /100 WBC         Imaging Results (Most Recent)     Procedure Component Value Units Date/Time    XR Chest 1 View [[id number]] Collected:  08/20/20 1946     Updated:  08/20/20 2000    Narrative:    "      PORTABLE CHEST    HISTORY: Supraventricular tachycardia.    Portable AP upright film of the chest was obtained at 7:36 PM.  COMPARISON: None    FINDINGS:   EKG leads.  The lungs are clear of an acute process.  The heart is not enlarged.  The pulmonary vasculature is not increased.  No pleural effusion.  No pneumothorax.  No acute osseous abnormality.  Old left rib fractures.      Impression:       CONCLUSION:  No Acute Disease    [id number]    Electronically signed by:  Steve Bass MD  8/20/2020 7:58 PM CDT  Workstation: [id number]          Chief Complaint on Day of Discharge: None    Hospital Course:  The patient is a 54 y.o. male with PMH notable for T2DM, LINDSEY, GERD, and HLD who presented to [facility] with palpitations.  Patient was noted to be in SVT.  He was converted to NSR in the ED via Adenosine injection.  Patient was also noted to have REESE and hyperglycemia.  He remained NSR for the remainder of his stay. His renal function also improved and his hyperglycemia also improved.  Patient noted feeling well the morning following admission and desired discharge home.  Patient will follow up with his PCP to discuss arrangements for outpatient work up as [facility]. Patient voiced understanding and agreement.     Condition on Discharge:  stable    Physical Exam on Discharge:  /96 (BP Location: Left arm, Patient Position: Lying)   Pulse 88   Temp 97.7 °F (36.5 °C) (Temporal)   Resp 18   Ht 172.7 cm (68\")   Wt 116 kg (256 lb 12.8 oz)   SpO2 99%   BMI 39.05 kg/m²   Physical Exam   Constitutional: He is oriented to person, place, and time. He appears well-developed and well-nourished. No distress.   HENT:   Head: Normocephalic and atraumatic.   Nose: Nose normal.   Mouth/Throat: Oropharynx is clear and moist.   Eyes: Conjunctivae are normal.   Neck: Normal range of motion. Neck supple.   Cardiovascular: Normal rate, regular rhythm, normal heart sounds and " intact distal pulses.   Pulmonary/Chest: Effort normal and breath sounds normal. No respiratory distress.   Abdominal: Soft. Bowel sounds are normal. He exhibits no distension. There is no tenderness.   Musculoskeletal: He exhibits no edema.   Neurological: He is alert and oriented to person, place, and time. Coordination normal.   Skin: Skin is warm and dry. Capillary refill takes less than 2 seconds.   Psychiatric: His behavior is normal.       Discharge Disposition:  Home or Self Care    Discharge Medications:     Discharge Medications      Continue These Medications      Instructions Start Date   albuterol sulfate  (90 Base) MCG/ACT inhaler  Commonly known as:  PROVENTIL HFA;VENTOLIN HFA;PROAIR HFA   2 puffs, Inhalation, Every 4 Hours PRN      atorvastatin 40 MG tablet  Commonly known as:  LIPITOR   40 mg, Oral, Nightly      Cyanocobalamin 1000 MCG/ML kit   Injection, Every 30 Days      folic acid 1 MG tablet  Commonly known as:  FOLVITE   1 mg, Oral, Nightly      glipizide 5 MG tablet  Commonly known as:  GLUCOTROL   5 mg, Oral, Nightly      insulin glargine 100 UNIT/ML injection  Commonly known as:  LANTUS   90 Units, Subcutaneous, Nightly      omeprazole 20 MG capsule  Commonly known as:  priLOSEC   20 mg, Oral, Nightly      Vitamin D3 50 MCG (2000 UT) tablet   Oral, Nightly         Stop These Medications    azithromycin 500 MG tablet  Commonly known as:  ZITHROMAX     LISINOPRIL PO            Discharge Diet:   Diet Instructions     Diet: Consistent Carbohydrate, Cardiac      Discharge Diet:   Consistent Carbohydrate  Cardiac             Activity at Discharge:   Activity Instructions     Activity as Tolerated            Discharge Care Plan/Instructions: Take medications as prescribed, follow up with PCP, return for worsening symptoms.     Follow-up Appointments:   Future Appointments   Date Time Provider Department Center   8/24/2020 10:00 AM Ivan Nichols PA-C [facility] None       Test Results  Pending at Discharge: None    Ivan Ghosh MD    Time: 29 minutes

## 2020-08-21 NOTE — PLAN OF CARE
Problem: Patient Care Overview  Goal: Plan of Care Review  Outcome: Ongoing (interventions implemented as appropriate)  Flowsheets  Taken 8/21/2020 0301  Progress: no change  Outcome Summary: Pt reports no chest pain, palpitations, SOA overnight. Will continue to monitor.  Taken 8/20/2020 2205  Plan of Care Reviewed With: patient

## 2020-08-23 LAB — GLUCOSE BLDC GLUCOMTR-MCNC: 144 MG/DL (ref 70–130)

## 2020-08-24 ENCOUNTER — OFFICE VISIT (OUTPATIENT)
Dept: GASTROENTEROLOGY | Facility: CLINIC | Age: 55
End: 2020-08-24

## 2020-08-24 VITALS
HEART RATE: 94 BPM | BODY MASS INDEX: 39.13 KG/M2 | SYSTOLIC BLOOD PRESSURE: 141 MMHG | HEIGHT: 68 IN | WEIGHT: 258.2 LBS | DIASTOLIC BLOOD PRESSURE: 90 MMHG

## 2020-08-24 DIAGNOSIS — R17 ELEVATED BILIRUBIN: ICD-10-CM

## 2020-08-24 DIAGNOSIS — K75.81 NASH (NONALCOHOLIC STEATOHEPATITIS): Primary | ICD-10-CM

## 2020-08-24 DIAGNOSIS — R74.8 ELEVATED LIVER ENZYMES: ICD-10-CM

## 2020-08-24 DIAGNOSIS — K21.00 GASTROESOPHAGEAL REFLUX DISEASE WITH ESOPHAGITIS: ICD-10-CM

## 2020-08-24 PROCEDURE — 99214 OFFICE O/P EST MOD 30 MIN: CPT | Performed by: PHYSICIAN ASSISTANT

## 2020-08-24 NOTE — PATIENT INSTRUCTIONS

## 2020-08-24 NOTE — PROGRESS NOTES
Chief Complaint   Patient presents with   • Lindsey   • Elevated Hepatic Enzymes   • Heartburn       ENDO PROCEDURE ORDERED:    Subjective    Rubén Maki is a 55 y.o. male. he is here today for follow-up.    History of Present Illness    Patient is seen on a recheck of his GERD, elevated liver enzymes, LINDSEY, F0/S1-S2/N1. Last seen 02/24/2020. GERD is doing well on Prilosec. He denied nausea or vomiting. Bowels are moving without blood or mucus. Weight is down 4.5 pounds since last visit. He had a previous Cologuard at the [facility] and has never had a colonoscopy.     Patient was recently hospitalized 08/20/2020 through 08/21/2020 with SVT and acute kidney injury. Apparently they did not change any medications. There was no consult by Cardiology. He states he is going to check with Dr. House at the [facility]. Initial laboratory, CBC showed a white count of 13.03, platelets 106,000. Normal magnesium, troponin, BNP. CMP showed glucose 529, creatinine 1.53, chloride 93, CO2 of 21, total bilirubin 2.1, alkaline phosphatase 147, AST 49, ALT 44; otherwise normal. Chest x-ray was negative. Repeat CBC showed platelets 68,000. Cholesterol panel showed triglycerides 178. A1c was 12.6%. Normal troponin, phosphorus, magnesium. BMP showed glucose 215, otherwise normal. Urinalysis showed glucose and protein.     ASSESSMENT/PLAN:  Patient with LINDSEY with hepatic fibrosis, elevated LFTs secondary to the above. Recent kidney injury. Was encouraged to follow up with Dr. House as soon as possible. Will continue dietary modification, significant weight loss. Will be due for hepatoma screening in 10/2020. We will plan followup at that time with right upper quadrant ultrasound, CBC, CMP, LINDSEY FibroSURE, AFP, INR prior. Further pending clinical course and the results of the above.  Will do complete abd US due to recent kidney injury.    The following portions of the patient's history were reviewed and updated as  appropriate:   Past Medical History:   Diagnosis Date   • Diabetes mellitus (CMS/HCC)    • Elevated liver enzymes    • Elevated liver enzymes    • Gall stones    • GERD (gastroesophageal reflux disease)    • GERD (gastroesophageal reflux disease)    • Hypertension    • LINDSEY (nonalcoholic steatohepatitis)    • LINDSEY (nonalcoholic steatohepatitis)      Past Surgical History:   Procedure Laterality Date   • GALLBLADDER SURGERY     • GASTRIC BYPASS     • SHOULDER SURGERY     • SKIN GRAFT       Family History   Problem Relation Age of Onset   • Hypertension Mother    • Heart disease Father    • Hypertension Father    • Cancer Brother    • Cancer Paternal Aunt    • Diabetes Paternal Aunt    • Cancer Paternal Grandmother    • Heart disease Paternal Grandfather    • No Known Problems Sister    • No Known Problems Sister    • Autism Son        No Known Allergies  Social History     Socioeconomic History   • Marital status:      Spouse name: Not on file   • Number of children: Not on file   • Years of education: Not on file   • Highest education level: Not on file   Tobacco Use   • Smoking status: Never Smoker   • Smokeless tobacco: Former User   Substance and Sexual Activity   • Alcohol use: Yes     Alcohol/week: 15.0 standard drinks     Types: 15 Cans of beer per week     Frequency: 2-3 times a week   • Drug use: No   • Sexual activity: Defer     Current Medications:  Prior to Admission medications    Medication Sig Start Date End Date Taking? Authorizing Provider   albuterol sulfate  (90 Base) MCG/ACT inhaler Inhale 2 puffs Every 4 (Four) Hours As Needed for Wheezing.   Yes Dmitriy Soto MD   atorvastatin (LIPITOR) 40 MG tablet Take 40 mg by mouth Every Night.   Yes Dmitriy Soto MD   Cholecalciferol (VITAMIN D3) 2000 units tablet Take  by mouth Every Night.   Yes Dmitriy Soto MD   Cyanocobalamin 1000 MCG/ML kit Inject  as directed Every 30 (Thirty) Days.   Yes Dmitriy Soto MD  "  folic acid (FOLVITE) 1 MG tablet Take 1 mg by mouth Every Night.   Yes ProviderDmitriy MD   glipiZIDE (GLUCOTROL) 5 MG tablet Take 5 mg by mouth Every Night.   Yes ProviderDmitriy MD   insulin glargine (LANTUS) 100 UNIT/ML injection Inject 90 Units under the skin into the appropriate area as directed Every Night.   Yes Dmitriy Soto MD   omeprazole (priLOSEC) 20 MG capsule Take 20 mg by mouth Every Night.   Yes ProviderDmitriy MD     Review of Systems  Review of Systems       Objective    /90   Pulse 94   Ht 172.7 cm (68\")   Wt 117 kg (258 lb 3.2 oz)   BMI 39.26 kg/m²   Physical Exam   Constitutional: He is oriented to person, place, and time. He appears well-developed and well-nourished. No distress.   HENT:   Head: Normocephalic and atraumatic.   Eyes: Pupils are equal, round, and reactive to light. EOM are normal.   Neck: Normal range of motion.   Cardiovascular: Normal rate, regular rhythm and normal heart sounds.   Pulmonary/Chest: Effort normal and breath sounds normal.   Abdominal: Soft. Bowel sounds are normal. He exhibits no shifting dullness, no distension, no abdominal bruit, no ascites and no mass. There is no hepatosplenomegaly. There is tenderness. There is no rigidity, no rebound, no guarding and no CVA tenderness. No hernia. Hernia confirmed negative in the ventral area.   Mild, obese, diffuse   Musculoskeletal: Normal range of motion.   Neurological: He is alert and oriented to person, place, and time.   Skin: Skin is warm and dry.   Psychiatric: He has a normal mood and affect. His behavior is normal. Judgment and thought content normal.   Nursing note and vitals reviewed.    Assessment/Plan      1. LINDSEY (nonalcoholic steatohepatitis)    2. Elevated liver enzymes    3. Gastroesophageal reflux disease with esophagitis    4. Elevated bilirubin    .   Rubén was seen today for lindsey, elevated hepatic enzymes and heartburn.    Diagnoses and all orders for this " visit:    LINDSEY (nonalcoholic steatohepatitis)  -     Comprehensive Metabolic Panel  -     Protime-INR  -     TSH  -     LINDSEY Fibrosure  -     AFP Tumor Marker  -     Cancel: US Liver  -     US Abdomen Complete    Elevated liver enzymes  -     Comprehensive Metabolic Panel  -     Protime-INR  -     TSH  -     LINDSEY Fibrosure  -     AFP Tumor Marker  -     Cancel: US Liver  -     US Abdomen Complete    Gastroesophageal reflux disease with esophagitis  -     Comprehensive Metabolic Panel  -     Protime-INR  -     TSH  -     LINDSEY Fibrosure  -     AFP Tumor Marker  -     Cancel: US Liver  -     US Abdomen Complete    Elevated bilirubin  -     Comprehensive Metabolic Panel  -     Protime-INR  -     TSH  -     LINDSEY Fibrosure  -     AFP Tumor Marker  -     Cancel: US Liver  -     US Abdomen Complete        Orders placed during this encounter include:  Orders Placed This Encounter   Procedures   • US Abdomen Complete     Order Specific Question:   Reason for Exam:     Answer:   recent REESE, elevated LFT   • Comprehensive Metabolic Panel   • Protime-INR   • TSH   • LINDSEY Fibrosure   • AFP Tumor Marker       Medications prescribed:  No orders of the defined types were placed in this encounter.      Requested Prescriptions      No prescriptions requested or ordered in this encounter       Review and/or summary of lab tests, radiology, procedures, medications. Review and summary of old records and obtaining of history. The risks and benefits of my recommendations, as well as other treatment options were discussed with the patient today. Questions were answered.    Follow-up: Return in about 1 month (around 9/24/2020), or if symptoms worsen or fail to improve.     * Surgery not found *      This document has been electronically signed by Ivan Nichols PA-C on August 31, 2020 18:28      Results for orders placed or performed in visit on 08/24/20   Protime-INR   Result Value Ref Range    Protime 13.3 11.1 - 15.3 Seconds    INR  0.97 0.80 - 1.20   TSH   Result Value Ref Range    TSH 2.760 0.270 - 4.200 uIU/mL   Comprehensive Metabolic Panel   Result Value Ref Range    Glucose 150 (H) 65 - 99 mg/dL    BUN 11 6 - 20 mg/dL    Creatinine 1.08 0.76 - 1.27 mg/dL    Sodium 141 136 - 145 mmol/L    Potassium 4.0 3.5 - 5.2 mmol/L    Chloride 102 98 - 107 mmol/L    CO2 25.0 22.0 - 29.0 mmol/L    Calcium 9.8 8.6 - 10.5 mg/dL    Total Protein 6.7 6.0 - 8.5 g/dL    Albumin 3.90 3.50 - 5.20 g/dL    ALT (SGPT) 44 (H) 1 - 41 U/L    AST (SGOT) 33 1 - 40 U/L    Alkaline Phosphatase 103 39 - 117 U/L    Total Bilirubin 2.1 (H) 0.0 - 1.2 mg/dL    eGFR Non African Amer 72 >60 mL/min/1.73    Globulin 2.8 gm/dL    A/G Ratio 1.4 g/dL    BUN/Creatinine Ratio 10.2 7.0 - 25.0    Anion Gap 14.0 5.0 - 15.0 mmol/L   Results for orders placed or performed during the hospital encounter of 08/20/20   Gold Top - SST   Result Value Ref Range    Extra Tube Hold for add-ons.    Green Top (Gel)   Result Value Ref Range    Extra Tube Hold for add-ons.    Scan Slide   Result Value Ref Range    RBC Morphology Normal Normal    WBC Morphology Normal Normal    Platelet Estimate Decreased Normal   Urinalysis, Microscopic Only - Urine, Clean Catch   Result Value Ref Range    RBC, UA 0-2 (A) None Seen /HPF    WBC, UA 0-2 None Seen, 0-2, 3-5 /HPF    Bacteria, UA None Seen None Seen /HPF    Squamous Epithelial Cells, UA None Seen None Seen, 0-2 /HPF    Hyaline Casts, UA 3-6 None Seen /LPF    Methodology Automated Microscopy    Urinalysis With Microscopic If Indicated (No Culture) - Urine, Clean Catch   Result Value Ref Range    Color, UA Yellow Yellow, Straw, Dark Yellow, Oralia    Appearance, UA Clear Clear    pH, UA 5.5 5.0 - 9.0    Specific Gravity, UA 1.037 (H) 1.003 - 1.030    Glucose, UA >=1000 mg/dL (3+) (A) Negative    Ketones, UA Negative Negative    Bilirubin, UA Negative Negative    Blood, UA Negative Negative    Protein, UA 30 mg/dL (1+) (A) Negative    Leuk Esterase, UA Negative  Negative    Nitrite, UA Negative Negative    Urobilinogen, UA 0.2 E.U./dL 0.2 - 1.0 E.U./dL   CBC Auto Differential   Result Value Ref Range    WBC 13.03 (H) 3.40 - 10.80 10*3/mm3    RBC 5.92 (H) 4.14 - 5.80 10*6/mm3    Hemoglobin 17.7 13.0 - 17.7 g/dL    Hematocrit 49.5 37.5 - 51.0 %    MCV 83.6 79.0 - 97.0 fL    MCH 29.9 26.6 - 33.0 pg    MCHC 35.8 (H) 31.5 - 35.7 g/dL    RDW 12.1 (L) 12.3 - 15.4 %    RDW-SD 36.8 (L) 37.0 - 54.0 fl    MPV 13.7 (H) 6.0 - 12.0 fL    Platelets 106 (L) 140 - 450 10*3/mm3    Neutrophil % 76.0 42.7 - 76.0 %    Lymphocyte % 14.1 (L) 19.6 - 45.3 %    Monocyte % 7.7 5.0 - 12.0 %    Eosinophil % 1.0 0.3 - 6.2 %    Basophil % 0.9 0.0 - 1.5 %    Immature Grans % 0.3 0.0 - 0.5 %    Neutrophils, Absolute 9.90 (H) 1.70 - 7.00 10*3/mm3    Lymphocytes, Absolute 1.84 0.70 - 3.10 10*3/mm3    Monocytes, Absolute 1.00 (H) 0.10 - 0.90 10*3/mm3    Eosinophils, Absolute 0.13 0.00 - 0.40 10*3/mm3    Basophils, Absolute 0.12 0.00 - 0.20 10*3/mm3    Immature Grans, Absolute 0.04 0.00 - 0.05 10*3/mm3    nRBC 0.0 0.0 - 0.2 /100 WBC   Lavender Top   Result Value Ref Range    Extra Tube hold for add-on    Light Blue Top   Result Value Ref Range    Extra Tube hold for add-on    Troponin   Result Value Ref Range    Troponin T <0.010 0.000 - 0.030 ng/mL   Troponin   Result Value Ref Range    Troponin T <0.010 0.000 - 0.030 ng/mL   CBC (No Diff)   Result Value Ref Range    WBC 8.22 3.40 - 10.80 10*3/mm3    RBC 5.22 4.14 - 5.80 10*6/mm3    Hemoglobin 15.6 13.0 - 17.7 g/dL    Hematocrit 44.0 37.5 - 51.0 %    MCV 84.3 79.0 - 97.0 fL    MCH 29.9 26.6 - 33.0 pg    MCHC 35.5 31.5 - 35.7 g/dL    RDW 12.3 12.3 - 15.4 %    RDW-SD 36.7 (L) 37.0 - 54.0 fl    MPV 13.2 (H) 6.0 - 12.0 fL    Platelets 68 (L) 140 - 450 10*3/mm3   POC Glucose Once   Result Value Ref Range    Glucose 144 (H) 70 - 130 mg/dL   POC Glucose Once   Result Value Ref Range    Glucose 369 (H) 70 - 130 mg/dL   POC Glucose Once   Result Value Ref Range     Glucose 388 (H) 70 - 130 mg/dL   POC Glucose Once   Result Value Ref Range    Glucose 196 (H) 70 - 130 mg/dL   Phosphorus   Result Value Ref Range    Phosphorus 3.8 2.5 - 4.5 mg/dL   BNP   Result Value Ref Range    proBNP 58.1 0.0 - 900.0 pg/mL   Magnesium   Result Value Ref Range    Magnesium 1.7 1.6 - 2.6 mg/dL   Magnesium   Result Value Ref Range    Magnesium 1.8 1.6 - 2.6 mg/dL   Hemoglobin A1c   Result Value Ref Range    Hemoglobin A1C 12.60 (H) 4.80 - 5.60 %   Lipid Panel   Result Value Ref Range    Total Cholesterol 93 0 - 200 mg/dL    Triglycerides 178 (H) 0 - 150 mg/dL    HDL Cholesterol 34 (L) 40 - 60 mg/dL    LDL Cholesterol  23 0 - 100 mg/dL    VLDL Cholesterol 35.6 mg/dL    LDL/HDL Ratio 0.69    Comprehensive Metabolic Panel   Result Value Ref Range    Glucose 529 (C) 65 - 99 mg/dL    BUN 13 6 - 20 mg/dL    Creatinine 1.53 (H) 0.76 - 1.27 mg/dL    Sodium 133 (L) 136 - 145 mmol/L    Potassium 4.3 3.5 - 5.2 mmol/L    Chloride 93 (L) 98 - 107 mmol/L    CO2 21.0 (L) 22.0 - 29.0 mmol/L    Calcium 9.9 8.6 - 10.5 mg/dL    Total Protein 7.5 6.0 - 8.5 g/dL    Albumin 4.30 3.50 - 5.20 g/dL    ALT (SGPT) 44 (H) 1 - 41 U/L    AST (SGOT) 49 (H) 1 - 40 U/L    Alkaline Phosphatase 147 (H) 39 - 117 U/L    Total Bilirubin 2.1 (H) 0.0 - 1.2 mg/dL    eGFR Non African Amer 48 (L) >60 mL/min/1.73    Globulin 3.2 gm/dL    A/G Ratio 1.3 g/dL    BUN/Creatinine Ratio 8.5 7.0 - 25.0    Anion Gap 19.0 (H) 5.0 - 15.0 mmol/L     *Note: Due to a large number of results and/or encounters for the requested time period, some results have not been displayed. A complete set of results can be found in Results Review.       Some portions of this note have been dictated using voice recognition software and may contain errors and/or omissions.

## 2020-08-24 NOTE — PAYOR COMM NOTE
"Gladys Wolf  [facility]  P :[phone]  F: [phone]          Lars Hernandez (55 y.o. Male)     Date of Birth Social Security Number Address Home Phone MRN    1965  [address] [place] [id number] [phone] [id number]    Tenriism Marital Status          None        Admission Date Admission Type Admitting Provider Attending Provider Department, Room/Bed    8/20/20 Emergency Kevan Wang MD  [address], 326/1    Discharge Date Discharge Disposition Discharge Destination        8/21/2020 Home or Self Care              Attending Provider:  (none)   Allergies:  No Known Allergies    Isolation:  None   Infection:  None   Code Status:  Prior    Ht:  172.7 cm (68\")   Wt:  116 kg (256 lb 12.8 oz)    Admission Cmt:  None   Principal Problem:  None                Active Insurance as of 8/20/2020     Primary Coverage     Payor Plan Insurance Group Employer/Plan Group    [facility] [employer]      Payor Plan Address Payor Plan Phone Number Payor Plan Fax Number Effective Dates    [address] [phone]  8/20/2020 - None Entered    [facility] [id number]       Subscriber Name Subscriber Birth Date Member ID       LARS HERNANDEZ 1965 [id number]                 Emergency Contacts      (Rel.) Home Phone Work Phone Mobile Phone    Laxmi Hernandez (Spouse) [phone] -- [phone]               Emergency Department Notes      Juan Carlos Ng MD at 08/20/20 1900      Procedure Orders    1. ECG 12 Lead [[id number]] ordered by Juan Carlos Ng MD at 08/20/20 1834     2. 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  He felt his heart racing.  He has had trouble over the last several months with palpitations, but has not discussed it with any physician " yet.      Palpitations   Palpitations quality:  Regular  Onset quality:  Sudden  Duration:  1 hour  Timing:  Constant  Progression:  Unchanged  Chronicity:  New  Context: dehydration    Context: not anxiety, not blood loss and not hyperventilation    Relieved by:  Nothing  Worsened by:  Nothing  Ineffective treatments:  None tried  Associated symptoms: dizziness    Associated symptoms: no chest pain, no cough, no diaphoresis, no nausea, no numbness, no shortness of breath and no weakness    Risk factors: no heart disease        Review of Systems   Constitutional: Positive for activity change. Negative for appetite change, chills, diaphoresis, fatigue and fever.   HENT: Negative for congestion and rhinorrhea.    Respiratory: Positive for chest tightness. Negative for cough, shortness of breath and wheezing.    Cardiovascular: Positive for palpitations. Negative for chest pain and leg swelling.   Gastrointestinal: Negative for abdominal pain, diarrhea and nausea.   Genitourinary: Negative for dysuria and flank pain.   Skin: Negative for color change and rash.   Neurological: Positive for dizziness and light-headedness. Negative for syncope, weakness, numbness and headaches.   Psychiatric/Behavioral: Negative for agitation. The patient is not nervous/anxious.        Past Medical History:   Diagnosis Date   • Diabetes mellitus (CMS/HCC)    • Elevated liver enzymes    • Elevated liver enzymes    • Gall stones    • GERD (gastroesophageal reflux disease)    • GERD (gastroesophageal reflux disease)    • Hypertension    • LINDSEY (nonalcoholic steatohepatitis)    • LINDSEY (nonalcoholic steatohepatitis)        No Known Allergies    Past Surgical History:   Procedure Laterality Date   • GALLBLADDER SURGERY     • GASTRIC BYPASS     • SHOULDER SURGERY     • SKIN GRAFT         Family History   Problem Relation Age of Onset   • Hypertension Mother    • Heart disease Father    • Hypertension Father    • Cancer Brother    • Cancer  "Paternal Aunt    • Diabetes Paternal Aunt    • Cancer Paternal Grandmother    • Heart disease Paternal Grandfather    • No Known Problems Sister    • No Known Problems Sister    • Autism Son        Social History     Socioeconomic History   • Marital status:      Spouse name: Not on file   • Number of children: Not on file   • Years of education: Not on file   • Highest education level: Not on file   Tobacco Use   • Smoking status: Never Smoker   • Smokeless tobacco: Former User   Substance and Sexual Activity   • Alcohol use: Yes     Alcohol/week: 15.0 standard drinks     Types: 15 Cans of beer per week     Frequency: 2-3 times a week   • Drug use: No   • Sexual activity: Defer           Objective    Vitals:    08/20/20 1956 08/20/20 2001 08/20/20 2031 08/20/20 2145   BP:  145/88 139/88 154/85   BP Location:    Right arm   Patient Position:    Lying   Pulse:  106 106 102   Resp:    20   Temp:    96.8 °F (36 °C)   TempSrc:    Oral   SpO2:  99% 99% 97%   Weight: 116 kg (255 lb)   118 kg (260 lb 6.4 oz)   Height: 172.7 cm (68\")   172.7 cm (68\")       Physical Exam   Constitutional: He is oriented to person, place, and time. He appears well-developed and well-nourished. He is active.  Non-toxic appearance. He does not have a sickly appearance. He does not appear ill. He appears distressed.   HENT:   Head: Normocephalic.   Right Ear: External ear normal.   Left Ear: External ear normal.   Mouth/Throat: Mucous membranes are normal.   Eyes: Conjunctivae are normal.   Cardiovascular: Regular rhythm, normal heart sounds and intact distal pulses. Tachycardia present.   Pulmonary/Chest: Effort normal. No accessory muscle usage. No respiratory distress. He has no decreased breath sounds. He has no wheezes. He exhibits no tenderness.   Abdominal: Soft. Bowel sounds are normal. There is no tenderness (deep palpation). There is no rigidity.   Neurological: He is alert and oriented to person, place, and time. He is not " disoriented. No cranial nerve deficit (grossly intact). GCS eye subscore is 4. GCS verbal subscore is 5. GCS motor subscore is 6.   Skin: Skin is warm and dry. Capillary refill takes less than 2 seconds. He is not diaphoretic.   Psychiatric: He has a normal mood and affect. His behavior is normal.   Nursing note and vitals reviewed.      ECG 12 Lead    Date/Time: 8/20/2020 10:11 PM  Performed by: Juan Carlos Ng MD  Authorized by: Juan Carlos Ng MD   Interpreted by physician  Rhythm: SVT  Rate: tachycardic  Clinical impression: abnormal ECG      ECG 12 Lead    Date/Time: 8/20/2020 10:12 PM  Performed by: Juan Carlos Ng MD  Authorized by: Juan Carlos Ng MD   Interpreted by physician  Rhythm: sinus tachycardia  Rate: tachycardic  ST Segments: ST segments normal  T Waves: T waves normal                  ED Course      Results for orders placed or performed during the hospital encounter of 08/20/20   Comprehensive Metabolic Panel   Result Value Ref Range    Glucose 529 (C) 65 - 99 mg/dL    BUN 13 6 - 20 mg/dL    Creatinine 1.53 (H) 0.76 - 1.27 mg/dL    Sodium 133 (L) 136 - 145 mmol/L    Potassium 4.3 3.5 - 5.2 mmol/L    Chloride 93 (L) 98 - 107 mmol/L    CO2 21.0 (L) 22.0 - 29.0 mmol/L    Calcium 9.9 8.6 - 10.5 mg/dL    Total Protein 7.5 6.0 - 8.5 g/dL    Albumin 4.30 3.50 - 5.20 g/dL    ALT (SGPT) 44 (H) 1 - 41 U/L    AST (SGOT) 49 (H) 1 - 40 U/L    Alkaline Phosphatase 147 (H) 39 - 117 U/L    Total Bilirubin 2.1 (H) 0.0 - 1.2 mg/dL    eGFR Non African Amer 48 (L) >60 mL/min/1.73    Globulin 3.2 gm/dL    A/G Ratio 1.3 g/dL    BUN/Creatinine Ratio 8.5 7.0 - 25.0    Anion Gap 19.0 (H) 5.0 - 15.0 mmol/L   Urinalysis With Microscopic If Indicated (No Culture) - Urine, Clean Catch   Result Value Ref Range    Color, UA Yellow Yellow, Straw, Dark Yellow, Oralia    Appearance, UA Clear Clear    pH, UA 5.5 5.0 - 9.0    Specific Gravity, UA 1.037 (H) 1.003 - 1.030    Glucose, UA >=1000 mg/dL (3+) (A) Negative     Ketones, UA Negative Negative    Bilirubin, UA Negative Negative    Blood, UA Negative Negative    Protein, UA 30 mg/dL (1+) (A) Negative    Leuk Esterase, UA Negative Negative    Nitrite, UA Negative Negative    Urobilinogen, UA 0.2 E.U./dL 0.2 - 1.0 E.U./dL   BNP   Result Value Ref Range    proBNP 58.1 0.0 - 900.0 pg/mL   Troponin   Result Value Ref Range    Troponin T <0.010 0.000 - 0.030 ng/mL   Magnesium   Result Value Ref Range    Magnesium 1.8 1.6 - 2.6 mg/dL   CBC Auto Differential   Result Value Ref Range    WBC 13.03 (H) 3.40 - 10.80 10*3/mm3    RBC 5.92 (H) 4.14 - 5.80 10*6/mm3    Hemoglobin 17.7 13.0 - 17.7 g/dL    Hematocrit 49.5 37.5 - 51.0 %    MCV 83.6 79.0 - 97.0 fL    MCH 29.9 26.6 - 33.0 pg    MCHC 35.8 (H) 31.5 - 35.7 g/dL    RDW 12.1 (L) 12.3 - 15.4 %    RDW-SD 36.8 (L) 37.0 - 54.0 fl    MPV 13.7 (H) 6.0 - 12.0 fL    Platelets 106 (L) 140 - 450 10*3/mm3    Neutrophil % 76.0 42.7 - 76.0 %    Lymphocyte % 14.1 (L) 19.6 - 45.3 %    Monocyte % 7.7 5.0 - 12.0 %    Eosinophil % 1.0 0.3 - 6.2 %    Basophil % 0.9 0.0 - 1.5 %    Immature Grans % 0.3 0.0 - 0.5 %    Neutrophils, Absolute 9.90 (H) 1.70 - 7.00 10*3/mm3    Lymphocytes, Absolute 1.84 0.70 - 3.10 10*3/mm3    Monocytes, Absolute 1.00 (H) 0.10 - 0.90 10*3/mm3    Eosinophils, Absolute 0.13 0.00 - 0.40 10*3/mm3    Basophils, Absolute 0.12 0.00 - 0.20 10*3/mm3    Immature Grans, Absolute 0.04 0.00 - 0.05 10*3/mm3    nRBC 0.0 0.0 - 0.2 /100 WBC   Scan Slide   Result Value Ref Range    RBC Morphology Normal Normal    WBC Morphology Normal Normal    Platelet Estimate Decreased Normal   Urinalysis, Microscopic Only - Urine, Clean Catch   Result Value Ref Range    RBC, UA 0-2 (A) None Seen /HPF    WBC, UA 0-2 None Seen, 0-2, 3-5 /HPF    Bacteria, UA None Seen None Seen /HPF    Squamous Epithelial Cells, UA None Seen None Seen, 0-2 /HPF    Hyaline Casts, UA 3-6 None Seen /LPF    Methodology Automated Microscopy    Light Blue Top   Result Value Ref Range     Extra Tube hold for add-on    Green Top (Gel)   Result Value Ref Range    Extra Tube Hold for add-ons.    Lavender Top   Result Value Ref Range    Extra Tube hold for add-on    Gold Top - SST   Result Value Ref Range    Extra Tube Hold for add-ons.      XR Chest 1 View   Final Result   CONCLUSION:   No Acute Disease      [id number]      Electronically signed by:  Steve Bass MD  8/20/2020 7:58 PM CDT   Workstation: [id number]                                           [facility]  Number of Diagnoses or Management Options  REESE (acute kidney injury) (CMS/[facility]): new and requires workup  SVT (supraventricular tachycardia) (CMS/[facility]): new and requires workup  Diagnosis management comments: Patient placed in bed 14 and evaluated by me. Vital signs are stable, afebrile.  Initial EKG showed SVT with a heart rate in the 190s.  Patient converted to normal sinus with a heart rate in the 110s after 6 and 12 of adenosine.  Labs obtained remarkable for a glucose of 500, REESE creatinine 1.5.  Troponin and BNP negative.  Chest x-ray showed no acute cardiopulmonary processes.  Repeat EKG showed sinus tachycardia.  IVF bolus was administered.  I spoke with the on-call hospitalist who agreed to admit for further evaluation and treatment.       Amount and/or Complexity of Data Reviewed  Clinical lab tests: reviewed and ordered  Tests in the radiology section of CPT®:  ordered and reviewed  Tests in the medicine section of CPT®:  ordered and reviewed  Decide to obtain previous medical records or to obtain history from someone other than the patient: yes  Discuss the patient with other providers: yes    Patient Progress  Patient progress: improved      Final diagnoses:   SVT (supraventricular tachycardia) (CMS/[facility])   REESE (acute kidney injury) (CMS/[facility])            Juan Carlos Ng MD  08/20/20 2213      Electronically signed by Juan Carlos Ng MD at 08/20/20 2213     Shellie Streeter, RN at 08/20/20 1953        fsbs  388     Shellie Streeter,  RN  08/20/20 1954      Electronically signed by Shellie Streeter RN at 08/20/20 1954     Karuna Kay RN at 08/20/20 2122        Report to KELLEY Valerio Kayla M, RN  08/20/20 2122      Electronically signed by Karuna Kay RN at 08/20/20 2122          Discharge Summary      Ivan Ghosh MD at 08/21/20 1143              [facility] Medicine Services  DISCHARGE SUMMARY       Date of Admission: 8/20/2020  Date of Discharge:  8/21/2020  Primary Care Physician: Willard House MD    Presenting Problem/History of Present Illness:  SVT (supraventricular tachycardia) (CMS/[facility]) [I47.1]  REESE (acute kidney injury) (CMS/HCC) [N17.9]     Final Discharge Diagnoses:  Active Hospital Problems    Diagnosis   • REESE (acute kidney injury) (CMS/HCC)   • SVT (supraventricular tachycardia) (CMS/[facility])       Consults:   Consults     No orders found for last 30 day(s).          Procedures Performed:                 Pertinent Test Results:   Lab Results (most recent)     Procedure Component Value Units Date/Time    CBC (No Diff) [[id number]]  (Abnormal) Collected:  08/21/20 0750    Specimen:  Blood Updated:  08/21/20 0819     WBC 8.22 10*3/mm3      RBC 5.22 10*6/mm3      Hemoglobin 15.6 g/dL      Hematocrit 44.0 %      MCV 84.3 fL      MCH 29.9 pg      MCHC 35.5 g/dL      RDW 12.3 %      RDW-SD 36.7 fl      MPV 13.2 fL      Platelets 68 10*3/mm3      Comment: Results confirmed by recollection       Basic Metabolic Panel [[id number]]  (Abnormal) Collected:  08/21/20 0615    Specimen:  Blood Updated:  08/21/20 0726     Glucose 215 mg/dL      BUN 13 mg/dL      Creatinine 1.14 mg/dL      Sodium 141 mmol/L      Potassium 3.6 mmol/L      Chloride 104 mmol/L      CO2 26.0 mmol/L      Calcium 9.4 mg/dL      eGFR Non African Amer 67 mL/min/1.73      BUN/Creatinine Ratio 11.4     Anion Gap 11.0 mmol/L     Narrative:       GFR Normal >60  Chronic Kidney Disease <60  Kidney Failure <15      Phosphorus  [[id number]]  (Normal) Collected:  08/21/20 0615    Specimen:  Blood Updated:  08/21/20 0721     Phosphorus 3.8 mg/dL     Lipid Panel [[id number]]  (Abnormal) Collected:  08/21/20 0615    Specimen:  Blood Updated:  08/21/20 0721     Total Cholesterol 93 mg/dL      Triglycerides 178 mg/dL      HDL Cholesterol 34 mg/dL      LDL Cholesterol  23 mg/dL      VLDL Cholesterol 35.6 mg/dL      LDL/HDL Ratio 0.69    Narrative:       Cholesterol Reference Ranges  (U.S. Department of Health and Human Services ATP III Classifications)    Desirable          <200 mg/dL  Borderline High    200-239 mg/dL  High Risk          >240 mg/dL      Triglyceride Reference Ranges  (U.S. Department of Health and Human Services ATP III Classifications)    Normal           <150 mg/dL  Borderline High  150-199 mg/dL  High             200-499 mg/dL  Very High        >500 mg/dL    HDL Reference Ranges  (U.S. Department of Health and Human Services ATP III Classifcations)    Low     <40 mg/dl (major risk factor for CHD)  High    >60 mg/dl ('negative' risk factor for CHD)        LDL Reference Ranges  (U.S. Department of Health and Human Services ATP III Classifcations)    Optimal          <100 mg/dL  Near Optimal     100-129 mg/dL  Borderline High  130-159 mg/dL  High             160-189 mg/dL  Very High        >189 mg/dL    Magnesium [[id number]]  (Normal) Collected:  08/21/20 0615    Specimen:  Blood Updated:  08/21/20 0721     Magnesium 1.7 mg/dL     Troponin [[id number]]  (Normal) Collected:  08/21/20 0615    Specimen:  Blood Updated:  08/21/20 0718     Troponin T <0.010 ng/mL     Narrative:       Troponin T Reference Range:  <= 0.03 ng/mL-   Negative for AMI  >0.03 ng/mL-     Abnormal for myocardial necrosis.  Clinicians would have to utilize clinical acumen, EKG, Troponin and serial changes to determine if it is an Acute Myocardial Infarction or myocardial injury due to an underlying chronic condition.       Results may be falsely decreased if  patient taking Biotin.      Hemoglobin A1c [[id number]]  (Abnormal) Collected:  08/21/20 0615    Specimen:  Blood Updated:  08/21/20 0717     Hemoglobin A1C 12.60 %     Narrative:       Hemoglobin A1C Ranges:    Increased Risk for Diabetes  5.7% to 6.4%  Diabetes                     >= 6.5%  Diabetic Goal                < 7.0%    Scan Slide [[id number]] Collected:  08/20/20 1854    Specimen:  Blood Updated:  08/20/20 2044     RBC Morphology Normal     WBC Morphology Normal     Platelet Estimate Decreased    [place] Draw [[id number]] Collected:  08/20/20 1854    Specimen:  Blood Updated:  08/20/20 2000    Narrative:       The following orders were created for panel order [place] Draw.  Procedure                               Abnormality         Status                     ---------                               -----------         ------                     Light Blue Top[[id number]]                                   Final result               Green Top (Gel)[[id number]]                                  Final result               Lavender Top[[id number]]                                     Final result               Gold Top - SST[[id number]]                                   Final result                 Please view results for these tests on the individual orders.    Light Blue Top [[id number]] Collected:  08/20/20 1854    Specimen:  Blood Updated:  08/20/20 2000     Extra Tube hold for add-on     Comment: Auto resulted       Green Top (Gel) [[id number]] Collected:  08/20/20 1854    Specimen:  Blood Updated:  08/20/20 2000     Extra Tube Hold for add-ons.     Comment: Auto resulted.       Lavender Top [[id number]] Collected:  08/20/20 1854    Specimen:  Blood Updated:  08/20/20 2000     Extra Tube hold for add-on     Comment: Auto resulted       Gold Top - SST [[id number]] Collected:  08/20/20 1854    Specimen:  Blood Updated:  08/20/20 2000     Extra Tube Hold for add-ons.     Comment: Auto resulted.       Comprehensive  Metabolic Panel [[id number]]  (Abnormal) Collected:  08/20/20 1854    Specimen:  Blood Updated:  08/20/20 1931     Glucose 529 mg/dL      BUN 13 mg/dL      Creatinine 1.53 mg/dL      Sodium 133 mmol/L      Potassium 4.3 mmol/L      Comment: Specimen hemolyzed.  Results may be affected.        Chloride 93 mmol/L      CO2 21.0 mmol/L      Calcium 9.9 mg/dL      Total Protein 7.5 g/dL      Albumin 4.30 g/dL      ALT (SGPT) 44 U/L      AST (SGOT) 49 U/L      Alkaline Phosphatase 147 U/L      Total Bilirubin 2.1 mg/dL      eGFR Non African Amer 48 mL/min/1.73      Globulin 3.2 gm/dL      A/G Ratio 1.3 g/dL      BUN/Creatinine Ratio 8.5     Anion Gap 19.0 mmol/L     Narrative:       GFR Normal >60  Chronic Kidney Disease <60  Kidney Failure <15      Magnesium [[id number]]  (Normal) Collected:  08/20/20 1854    Specimen:  Blood Updated:  08/20/20 1928     Magnesium 1.8 mg/dL     Troponin [[id number]]  (Normal) Collected:  08/20/20 1854    Specimen:  Blood Updated:  08/20/20 1927     Troponin T <0.010 ng/mL     Narrative:       Troponin T Reference Range:  <= 0.03 ng/mL-   Negative for AMI  >0.03 ng/mL-     Abnormal for myocardial necrosis.  Clinicians would have to utilize clinical acumen, EKG, Troponin and serial changes to determine if it is an Acute Myocardial Infarction or myocardial injury due to an underlying chronic condition.       Results may be falsely decreased if patient taking Biotin.      BNP [[id number]]  (Normal) Collected:  08/20/20 1854    Specimen:  Blood Updated:  08/20/20 1927     proBNP 58.1 pg/mL     Narrative:       Among patients with dyspnea, NT-proBNP is highly sensitive for the detection of acute congestive heart failure. In addition NT-proBNP of <300 pg/ml effectively rules out acute congestive heart failure with 99% negative predictive value.    Results may be falsely decreased if patient taking Biotin.      Urinalysis, Microscopic Only - Urine, Clean Catch [[id number]]  (Abnormal) Collected:   08/20/20 1911    Specimen:  Urine, Clean Catch Updated:  08/20/20 1922     RBC, UA 0-2 /HPF      WBC, UA 0-2 /HPF      Bacteria, UA None Seen /HPF      Squamous Epithelial Cells, UA None Seen /HPF      Hyaline Casts, UA 3-6 /LPF      Methodology Automated Microscopy    Urinalysis With Microscopic If Indicated (No Culture) - Urine, Clean Catch [[id number]]  (Abnormal) Collected:  08/20/20 1911    Specimen:  Urine, Clean Catch Updated:  08/20/20 1922     Color, UA Yellow     Appearance, UA Clear     pH, UA 5.5     Specific Gravity, UA 1.037     Comment: Result obtained by Refractometer        Glucose, UA >=1000 mg/dL (3+)     Ketones, UA Negative     Bilirubin, UA Negative     Blood, UA Negative     Protein, UA 30 mg/dL (1+)     Leuk Esterase, UA Negative     Nitrite, UA Negative     Urobilinogen, UA 0.2 E.U./dL    CBC & Differential [[id number]] Collected:  08/20/20 1854    Specimen:  Blood Updated:  08/20/20 1922    Narrative:       The following orders were created for panel order CBC & Differential.  Procedure                               Abnormality         Status                     ---------                               -----------         ------                     CBC Auto Differential[[id number]]        Abnormal            Final result                 Please view results for these tests on the individual orders.    CBC Auto Differential [[id number]]  (Abnormal) Collected:  08/20/20 1854    Specimen:  Blood Updated:  08/20/20 1922     WBC 13.03 10*3/mm3      RBC 5.92 10*6/mm3      Hemoglobin 17.7 g/dL      Hematocrit 49.5 %      MCV 83.6 fL      MCH 29.9 pg      MCHC 35.8 g/dL      RDW 12.1 %      RDW-SD 36.8 fl      MPV 13.7 fL      Platelets 106 10*3/mm3      Neutrophil % 76.0 %      Lymphocyte % 14.1 %      Monocyte % 7.7 %      Eosinophil % 1.0 %      Basophil % 0.9 %      Immature Grans % 0.3 %      Neutrophils, Absolute 9.90 10*3/mm3      Lymphocytes, Absolute 1.84 10*3/mm3      Monocytes, Absolute 1.00  "10*3/mm3      Eosinophils, Absolute 0.13 10*3/mm3      Basophils, Absolute 0.12 10*3/mm3      Immature Grans, Absolute 0.04 10*3/mm3      nRBC 0.0 /100 WBC         Imaging Results (Most Recent)     Procedure Component Value Units Date/Time    XR Chest 1 View [[id number]] Collected:  08/20/20 1946     Updated:  08/20/20 2000    Narrative:         PORTABLE CHEST    HISTORY: Supraventricular tachycardia.    Portable AP upright film of the chest was obtained at 7:36 PM.  COMPARISON: None    FINDINGS:   EKG leads.  The lungs are clear of an acute process.  The heart is not enlarged.  The pulmonary vasculature is not increased.  No pleural effusion.  No pneumothorax.  No acute osseous abnormality.  Old left rib fractures.      Impression:       CONCLUSION:  No Acute Disease    [id number]    Electronically signed by:  Steve Bass MD  8/20/2020 7:58 PM CDT  Workstation: [id number]          Chief Complaint on Day of Discharge: None    Hospital Course:  The patient is a 54 y.o. male with PMH notable for T2DM, LINDSEY, GERD, and HLD who presented to [facility] with palpitations.  Patient was noted to be in SVT.  He was converted to NSR in the ED via Adenosine injection.  Patient was also noted to have REESE and hyperglycemia.  He remained NSR for the remainder of his stay. His renal function also improved and his hyperglycemia also improved.  Patient noted feeling well the morning following admission and desired discharge home.  Patient will follow up with his PCP to discuss arrangements for outpatient work up [facility] Patient voiced understanding and agreement.     Condition on Discharge:  stable    Physical Exam on Discharge:  /96 (BP Location: Left arm, Patient Position: Lying)   Pulse 88   Temp 97.7 °F (36.5 °C) (Temporal)   Resp 18   Ht 172.7 cm (68\")   Wt 116 kg (256 lb 12.8 oz)   SpO2 99%   BMI 39.05 kg/m²    Physical Exam   Constitutional: He is oriented to " person, place, and time. He appears well-developed and well-nourished. No distress.   HENT:   Head: Normocephalic and atraumatic.   Nose: Nose normal.   Mouth/Throat: Oropharynx is clear and moist.   Eyes: Conjunctivae are normal.   Neck: Normal range of motion. Neck supple.   Cardiovascular: Normal rate, regular rhythm, normal heart sounds and intact distal pulses.   Pulmonary/Chest: Effort normal and breath sounds normal. No respiratory distress.   Abdominal: Soft. Bowel sounds are normal. He exhibits no distension. There is no tenderness.   Musculoskeletal: He exhibits no edema.   Neurological: He is alert and oriented to person, place, and time. Coordination normal.   Skin: Skin is warm and dry. Capillary refill takes less than 2 seconds.   Psychiatric: His behavior is normal.       Discharge Disposition:  Home or Self Care    Discharge Medications:     Discharge Medications      Continue These Medications      Instructions Start Date   albuterol sulfate  (90 Base) MCG/ACT inhaler  Commonly known as:  PROVENTIL HFA;VENTOLIN HFA;PROAIR HFA   2 puffs, Inhalation, Every 4 Hours PRN      atorvastatin 40 MG tablet  Commonly known as:  LIPITOR   40 mg, Oral, Nightly      Cyanocobalamin 1000 MCG/ML kit   Injection, Every 30 Days      folic acid 1 MG tablet  Commonly known as:  FOLVITE   1 mg, Oral, Nightly      glipizide 5 MG tablet  Commonly known as:  GLUCOTROL   5 mg, Oral, Nightly      insulin glargine 100 UNIT/ML injection  Commonly known as:  LANTUS   90 Units, Subcutaneous, Nightly      omeprazole 20 MG capsule  Commonly known as:  priLOSEC   20 mg, Oral, Nightly      Vitamin D3 50 MCG (2000 UT) tablet   Oral, Nightly         Stop These Medications    azithromycin 500 MG tablet  Commonly known as:  ZITHROMAX     LISINOPRIL PO            Discharge Diet:   Diet Instructions     Diet: Consistent Carbohydrate, Cardiac      Discharge Diet:   Consistent Carbohydrate  Cardiac             Activity at Discharge:    Activity Instructions     Activity as Tolerated            Discharge Care Plan/Instructions: Take medications as prescribed, follow up with PCP, return for worsening symptoms.     Follow-up Appointments:   Future Appointments   Date Time Provider Department Center   8/24/2020 10:00 AM Ivan Nichols PA-C [facility] None       Test Results Pending at Discharge: None    Ivan Ghosh MD    Time: 29 minutes      Electronically signed by Ivan Ghosh MD at 08/21/20 9482

## 2020-08-31 ENCOUNTER — LAB (OUTPATIENT)
Dept: LAB | Facility: HOSPITAL | Age: 55
End: 2020-08-31

## 2020-08-31 ENCOUNTER — HOSPITAL ENCOUNTER (OUTPATIENT)
Dept: ULTRASOUND IMAGING | Facility: HOSPITAL | Age: 55
Discharge: HOME | End: 2020-08-31
Admitting: PHYSICIAN ASSISTANT

## 2020-08-31 LAB
ALBUMIN SERPL-MCNC: 3.9 G/DL (ref 3.5–5.2)
ALBUMIN/GLOB SERPL: 1.4 G/DL
ALP SERPL-CCNC: 103 U/L (ref 39–117)
ALPHA-FETOPROTEIN: 6.68 NG/ML (ref 0–8.3)
ALT SERPL W P-5'-P-CCNC: 44 U/L (ref 1–41)
ANION GAP SERPL CALCULATED.3IONS-SCNC: 14 MMOL/L (ref 5–15)
AST SERPL-CCNC: 33 U/L (ref 1–40)
BILIRUB SERPL-MCNC: 2.1 MG/DL (ref 0–1.2)
BUN SERPL-MCNC: 11 MG/DL (ref 6–20)
BUN/CREAT SERPL: 10.2 (ref 7–25)
CALCIUM SPEC-SCNC: 9.8 MG/DL (ref 8.6–10.5)
CHLORIDE SERPL-SCNC: 102 MMOL/L (ref 98–107)
CO2 SERPL-SCNC: 25 MMOL/L (ref 22–29)
CREAT SERPL-MCNC: 1.08 MG/DL (ref 0.76–1.27)
GFR SERPL CREATININE-BSD FRML MDRD: 72 ML/MIN/1.73
GLOBULIN UR ELPH-MCNC: 2.8 GM/DL
GLUCOSE SERPL-MCNC: 150 MG/DL (ref 65–99)
INR PPP: 0.97 (ref 0.8–1.2)
POTASSIUM SERPL-SCNC: 4 MMOL/L (ref 3.5–5.2)
PROT SERPL-MCNC: 6.7 G/DL (ref 6–8.5)
PROTHROMBIN TIME: 13.3 SECONDS (ref 11.1–15.3)
SODIUM SERPL-SCNC: 141 MMOL/L (ref 136–145)
TSH SERPL DL<=0.05 MIU/L-ACNC: 2.76 UIU/ML (ref 0.27–4.2)

## 2020-08-31 PROCEDURE — 82105 ALPHA-FETOPROTEIN SERUM: CPT | Performed by: PHYSICIAN ASSISTANT

## 2020-08-31 PROCEDURE — 82977 ASSAY OF GGT: CPT | Performed by: PHYSICIAN ASSISTANT

## 2020-08-31 PROCEDURE — 84443 ASSAY THYROID STIM HORMONE: CPT | Performed by: PHYSICIAN ASSISTANT

## 2020-08-31 PROCEDURE — 82465 ASSAY BLD/SERUM CHOLESTEROL: CPT | Performed by: PHYSICIAN ASSISTANT

## 2020-08-31 PROCEDURE — 36415 COLL VENOUS BLD VENIPUNCTURE: CPT | Performed by: PHYSICIAN ASSISTANT

## 2020-08-31 PROCEDURE — 83883 ASSAY NEPHELOMETRY NOT SPEC: CPT | Performed by: PHYSICIAN ASSISTANT

## 2020-08-31 PROCEDURE — 76700 US EXAM ABDOM COMPLETE: CPT

## 2020-08-31 PROCEDURE — 84478 ASSAY OF TRIGLYCERIDES: CPT | Performed by: PHYSICIAN ASSISTANT

## 2020-08-31 PROCEDURE — 85610 PROTHROMBIN TIME: CPT | Performed by: PHYSICIAN ASSISTANT

## 2020-08-31 PROCEDURE — 82172 ASSAY OF APOLIPOPROTEIN: CPT | Performed by: PHYSICIAN ASSISTANT

## 2020-08-31 PROCEDURE — 83010 ASSAY OF HAPTOGLOBIN QUANT: CPT | Performed by: PHYSICIAN ASSISTANT

## 2020-08-31 PROCEDURE — 80053 COMPREHEN METABOLIC PANEL: CPT | Performed by: PHYSICIAN ASSISTANT

## 2020-09-02 LAB
A2 MACROGLOB SERPL-MCNC: 233 MG/DL (ref 110–276)
ALT SERPL W P-5'-P-CCNC: 51 IU/L (ref 0–55)
APO A-I SERPL-MCNC: 121 MG/DL (ref 101–178)
AST SERPL W P-5'-P-CCNC: 44 IU/L (ref 0–40)
BILIRUB SERPL-MCNC: 1.9 MG/DL (ref 0–1.2)
CHOLEST SERPL-MCNC: 109 MG/DL (ref 100–199)
FIBROSIS SCORING:: ABNORMAL
FIBROSIS STAGE SERPL QL: ABNORMAL
GGT SERPL-CCNC: 36 IU/L (ref 0–65)
GLUCOSE SERPL-MCNC: 151 MG/DL (ref 65–99)
HAPTOGLOB SERPL-MCNC: 135 MG/DL (ref 29–370)
INTERPRETATIONS: (REFERENCE): ABNORMAL
LABORATORY COMMENT REPORT: ABNORMAL
LIMITATIONS: (REFERENCE): ABNORMAL
LIVER FIBR SCORE SERPL CALC.FIBROSURE: 0.62 (ref 0–0.21)
NASH GRADE (REFERENCE): ABNORMAL
NASH SCORE (REFERENCE): 0.5
NASH SCORING (REFERENCE): ABNORMAL
STEATOSIS GRADE (REFERENCE): ABNORMAL
STEATOSIS GRADING (REFERENCE): ABNORMAL
STEATOSIS SCORE (REFERENCE): 0.65 (ref 0–0.3)
TRIGL SERPL-MCNC: 155 MG/DL (ref 0–149)
WEIGHT: (REFERENCE): 258 LBS

## 2020-09-24 ENCOUNTER — OFFICE VISIT (OUTPATIENT)
Dept: GASTROENTEROLOGY | Facility: CLINIC | Age: 55
End: 2020-09-24

## 2020-09-24 VITALS
BODY MASS INDEX: 40.56 KG/M2 | HEIGHT: 68 IN | WEIGHT: 267.6 LBS | HEART RATE: 68 BPM | SYSTOLIC BLOOD PRESSURE: 141 MMHG | DIASTOLIC BLOOD PRESSURE: 88 MMHG

## 2020-09-24 DIAGNOSIS — K21.00 GASTROESOPHAGEAL REFLUX DISEASE WITH ESOPHAGITIS WITHOUT HEMORRHAGE: ICD-10-CM

## 2020-09-24 DIAGNOSIS — K75.81 NASH (NONALCOHOLIC STEATOHEPATITIS): Primary | ICD-10-CM

## 2020-09-24 DIAGNOSIS — R74.8 ELEVATED LIVER ENZYMES: ICD-10-CM

## 2020-09-24 PROCEDURE — 99214 OFFICE O/P EST MOD 30 MIN: CPT | Performed by: PHYSICIAN ASSISTANT

## 2020-09-24 RX ORDER — METOPROLOL SUCCINATE 50 MG/1
25 TABLET, EXTENDED RELEASE ORAL 2 TIMES DAILY
COMMUNITY
End: 2022-03-23 | Stop reason: SDUPTHER

## 2020-12-21 ENCOUNTER — LAB (OUTPATIENT)
Dept: LAB | Facility: HOSPITAL | Age: 55
End: 2020-12-21

## 2020-12-21 DIAGNOSIS — K75.81 NASH (NONALCOHOLIC STEATOHEPATITIS): ICD-10-CM

## 2020-12-21 DIAGNOSIS — R74.8 ELEVATED LIVER ENZYMES: ICD-10-CM

## 2020-12-21 LAB
ALBUMIN SERPL-MCNC: 4 G/DL (ref 3.5–5.2)
ALBUMIN/GLOB SERPL: 1.6 G/DL
ALP SERPL-CCNC: 102 U/L (ref 39–117)
ALT SERPL W P-5'-P-CCNC: 25 U/L (ref 1–41)
ANION GAP SERPL CALCULATED.3IONS-SCNC: 10 MMOL/L (ref 5–15)
ANISOCYTOSIS BLD QL: ABNORMAL
AST SERPL-CCNC: 22 U/L (ref 1–40)
BASOPHILS # BLD MANUAL: 0.16 10*3/MM3 (ref 0–0.2)
BASOPHILS NFR BLD AUTO: 2 % (ref 0–1.5)
BILIRUB SERPL-MCNC: 1.6 MG/DL (ref 0–1.2)
BUN SERPL-MCNC: 11 MG/DL (ref 6–20)
BUN/CREAT SERPL: 9.5 (ref 7–25)
BURR CELLS BLD QL SMEAR: ABNORMAL
CALCIUM SPEC-SCNC: 9.6 MG/DL (ref 8.6–10.5)
CHLORIDE SERPL-SCNC: 101 MMOL/L (ref 98–107)
CO2 SERPL-SCNC: 26 MMOL/L (ref 22–29)
CREAT SERPL-MCNC: 1.16 MG/DL (ref 0.76–1.27)
DACRYOCYTES BLD QL SMEAR: ABNORMAL
DEPRECATED RDW RBC AUTO: 40.6 FL (ref 37–54)
EOSINOPHIL # BLD MANUAL: 0.08 10*3/MM3 (ref 0–0.4)
EOSINOPHIL NFR BLD MANUAL: 1 % (ref 0.3–6.2)
ERYTHROCYTE [DISTWIDTH] IN BLOOD BY AUTOMATED COUNT: 12.4 % (ref 12.3–15.4)
GFR SERPL CREATININE-BSD FRML MDRD: 66 ML/MIN/1.73
GLOBULIN UR ELPH-MCNC: 2.5 GM/DL
GLUCOSE SERPL-MCNC: 251 MG/DL (ref 65–99)
HCT VFR BLD AUTO: 45 % (ref 37.5–51)
HGB BLD-MCNC: 15.6 G/DL (ref 13–17.7)
INR PPP: 0.95 (ref 0.8–1.2)
LYMPHOCYTES # BLD MANUAL: 1.08 10*3/MM3 (ref 0.7–3.1)
LYMPHOCYTES NFR BLD MANUAL: 13.3 % (ref 19.6–45.3)
LYMPHOCYTES NFR BLD MANUAL: 5.1 % (ref 5–12)
MCH RBC QN AUTO: 30.7 PG (ref 26.6–33)
MCHC RBC AUTO-ENTMCNC: 34.7 G/DL (ref 31.5–35.7)
MCV RBC AUTO: 88.6 FL (ref 79–97)
MONOCYTES # BLD AUTO: 0.42 10*3/MM3 (ref 0.1–0.9)
NEUTROPHILS # BLD AUTO: 6.41 10*3/MM3 (ref 1.7–7)
NEUTROPHILS NFR BLD MANUAL: 78.6 % (ref 42.7–76)
PLAT MORPH BLD: NORMAL
PLATELET # BLD AUTO: 40 10*3/MM3 (ref 140–450)
PMV BLD AUTO: 14.4 FL (ref 6–12)
POIKILOCYTOSIS BLD QL SMEAR: ABNORMAL
POTASSIUM SERPL-SCNC: 4.5 MMOL/L (ref 3.5–5.2)
PROT SERPL-MCNC: 6.5 G/DL (ref 6–8.5)
PROTHROMBIN TIME: 13.1 SECONDS (ref 11.1–15.3)
RBC # BLD AUTO: 5.08 10*6/MM3 (ref 4.14–5.8)
SODIUM SERPL-SCNC: 137 MMOL/L (ref 136–145)
WBC # BLD AUTO: 8.15 10*3/MM3 (ref 3.4–10.8)
WBC MORPH BLD: NORMAL

## 2020-12-21 PROCEDURE — 85007 BL SMEAR W/DIFF WBC COUNT: CPT

## 2020-12-21 PROCEDURE — 80053 COMPREHEN METABOLIC PANEL: CPT

## 2020-12-21 PROCEDURE — 85025 COMPLETE CBC W/AUTO DIFF WBC: CPT

## 2020-12-21 PROCEDURE — 85610 PROTHROMBIN TIME: CPT

## 2020-12-21 PROCEDURE — 36415 COLL VENOUS BLD VENIPUNCTURE: CPT

## 2020-12-28 ENCOUNTER — OFFICE VISIT (OUTPATIENT)
Dept: GASTROENTEROLOGY | Facility: CLINIC | Age: 55
End: 2020-12-28

## 2020-12-28 VITALS
WEIGHT: 256.8 LBS | DIASTOLIC BLOOD PRESSURE: 97 MMHG | SYSTOLIC BLOOD PRESSURE: 141 MMHG | BODY MASS INDEX: 38.92 KG/M2 | HEIGHT: 68 IN | HEART RATE: 94 BPM

## 2020-12-28 DIAGNOSIS — R74.8 ELEVATED LIVER ENZYMES: ICD-10-CM

## 2020-12-28 DIAGNOSIS — K75.81 NASH (NONALCOHOLIC STEATOHEPATITIS): Primary | ICD-10-CM

## 2020-12-28 DIAGNOSIS — K21.00 GASTROESOPHAGEAL REFLUX DISEASE WITH ESOPHAGITIS WITHOUT HEMORRHAGE: ICD-10-CM

## 2020-12-28 PROCEDURE — 99213 OFFICE O/P EST LOW 20 MIN: CPT | Performed by: PHYSICIAN ASSISTANT

## 2020-12-28 NOTE — PATIENT INSTRUCTIONS
"BMI for Adults  What is BMI?  Body mass index (BMI) is a number that is calculated from a person's weight and height. BMI can help estimate how much of a person's weight is composed of fat. BMI does not measure body fat directly. Rather, it is an alternative to procedures that directly measure body fat, which can be difficult and expensive.  BMI can help identify people who may be at higher risk for certain medical problems.  What are BMI measurements used for?  BMI is used as a screening tool to identify possible weight problems. It helps determine whether a person is obese, overweight, a healthy weight, or underweight.  BMI is useful for:  · Identifying a weight problem that may be related to a medical condition or may increase the risk for medical problems.  · Promoting changes, such as changes in diet and exercise, to help reach a healthy weight. BMI screening can be repeated to see if these changes are working.  How is BMI calculated?  BMI involves measuring your weight in relation to your height. Both height and weight are measured, and the BMI is calculated from those numbers. This can be done either in English ([place] or metric measurements. Note that charts and online BMI calculators are available to help you find your BMI quickly and easily without having to do these calculations yourself.  To calculate your BMI in English ([place] measurements:    1. Measure your weight in pounds (lb).  2. Multiply the number of pounds by 703.  ? For example, for a person who weighs 180 lb, multiply that number by 703, which equals 126,540.  3. Measure your height in inches. Then multiply that number by itself to get a measurement called \"inches squared.\"  ? For example, for a person who is 70 inches tall, the \"inches squared\" measurement is 70 inches x 70 inches, which equals 4,900 inches squared.  4. Divide the total from step 2 (number of lb x 703) by the total from step 3 (inches squared): 126,540 ÷ 4,900 = 25.8. This is " "your BMI.  To calculate your BMI in metric measurements:  1. Measure your weight in kilograms (kg).  2. Measure your height in meters (m). Then multiply that number by itself to get a measurement called \"meters squared.\"  ? For example, for a person who is 1.75 m tall, the \"meters squared\" measurement is 1.75 m x 1.75 m, which is equal to 3.1 meters squared.  3. Divide the number of kilograms (your weight) by the meters squared number. In this example: 70 ÷ 3.1 = 22.6. This is your BMI.  What do the results mean?  BMI charts are used to identify whether you are underweight, normal weight, overweight, or obese. The following guidelines will be used:  · Underweight: BMI less than 18.5.  · Normal weight: BMI between 18.5 and 24.9.  · Overweight: BMI between 25 and 29.9.  · Obese: BMI of 30 or above.  Keep these notes in mind:  · Weight includes both fat and muscle, so someone with a muscular build, such as an athlete, may have a BMI that is higher than 24.9. In cases like these, BMI is not an accurate measure of body fat.  · To determine if excess body fat is the cause of a BMI of 25 or higher, further assessments may need to be done by a health care provider.  · BMI is usually interpreted in the same way for men and women.  Where to find more information  For more information about BMI, including tools to quickly calculate your BMI, go to these websites:  · Centers for Disease Control and Prevention: www.cdc.gov  · American Heart Association: www.heart.org  · National Heart, Lung, and Blood [place]: www.nhlbi.nih.gov  Summary  · Body mass index (BMI) is a number that is calculated from a person's weight and height.  · BMI may help estimate how much of a person's weight is composed of fat. BMI can help identify those who may be at higher risk for certain medical problems.  · BMI can be measured using English measurements or metric measurements.  · BMI charts are used to identify whether you are underweight, normal " weight, overweight, or obese.  This information is not intended to replace advice given to you by your health care provider. Make sure you discuss any questions you have with your health care provider.  Document Revised: 09/09/2020 Document Reviewed: 07/17/2020  Elsevier Patient Education © 2020 [employer]

## 2020-12-30 ENCOUNTER — LAB (OUTPATIENT)
Dept: ONCOLOGY | Facility: HOSPITAL | Age: 55
End: 2020-12-30

## 2020-12-30 ENCOUNTER — CONSULT (OUTPATIENT)
Dept: ONCOLOGY | Facility: CLINIC | Age: 55
End: 2020-12-30

## 2020-12-30 VITALS
HEART RATE: 89 BPM | BODY MASS INDEX: 38.8 KG/M2 | TEMPERATURE: 97.1 F | DIASTOLIC BLOOD PRESSURE: 100 MMHG | RESPIRATION RATE: 18 BRPM | HEIGHT: 68 IN | SYSTOLIC BLOOD PRESSURE: 151 MMHG | WEIGHT: 256 LBS

## 2020-12-30 DIAGNOSIS — E66.01 MORBIDLY OBESE (HCC): ICD-10-CM

## 2020-12-30 DIAGNOSIS — K76.0 NONALCOHOLIC FATTY LIVER DISEASE: ICD-10-CM

## 2020-12-30 DIAGNOSIS — D69.6 THROMBOCYTOPENIA (HCC): Primary | ICD-10-CM

## 2020-12-30 PROCEDURE — G0463 HOSPITAL OUTPT CLINIC VISIT: HCPCS | Performed by: INTERNAL MEDICINE

## 2020-12-30 PROCEDURE — 99204 OFFICE O/P NEW MOD 45 MIN: CPT | Performed by: INTERNAL MEDICINE

## 2020-12-31 NOTE — PROGRESS NOTES
REASON FOR CONSULTATION:  Thrombocytopenia   Provide an opinion on any further workup or treatment                             REQUESTING PHYSICIAN:  Willard House MD      RECORDS OBTAINED:  Records of the patients history including those obtained from the referring provider were reviewed and summarized in detail.      History of Present Illness     This is a pleasant 55 year old male who was seen in consultation at the request of Willard House MD for evaluation of thrombocytopenia. He has past medical history of obesity, hypertension, type 2 DM, hyperlipidemia, cirrhosis of liver. Patient recently has routine laboratory testing performed on 12/21/20 which showed thrombocytopenia with platelet count of 40k. Patient does report history of thrombocytopenia going back to at least 2010.  Her baseline is around 65k to 110k. However recently he was lowest at 40k. He denies any bleeding complains. No BRBPR or melena. Patient had bone marrow aspiration/biopsy performed on 1/22/15 which showed normocellular marrow.      Denies any unintentional weight loss, drenching night sweats or high fever.   Patient denies any prior history of hematological disorder.   No prior history of anemia.   No prior history of arterial or venous thrombosis.   No family history of hematological disorder.   No prior history of hepatitis infection.   No new medications.   No history of autoimmune disease.        Past Medical History:   Diagnosis Date   • Diabetes mellitus (CMS/HCC)    • Elevated liver enzymes    • Elevated liver enzymes    • Gall stones    • GERD (gastroesophageal reflux disease)    • GERD (gastroesophageal reflux disease)    • Hypertension    • LINDSEY (nonalcoholic steatohepatitis)    • LINDSEY (nonalcoholic steatohepatitis)    • Thrombocytopenia (CMS/HCC)         Past Surgical History:   Procedure Laterality Date   • GALLBLADDER SURGERY     • GASTRIC BYPASS     • SHOULDER SURGERY     • SKIN GRAFT          Current Outpatient  Medications on File Prior to Visit   Medication Sig Dispense Refill   • albuterol sulfate  (90 Base) MCG/ACT inhaler Inhale 2 puffs Every 4 (Four) Hours As Needed for Wheezing.     • atorvastatin (LIPITOR) 40 MG tablet Take 40 mg by mouth Every Night.     • Cholecalciferol (VITAMIN D3) 2000 units tablet Take  by mouth Every Night.     • Cyanocobalamin 1000 MCG/ML kit Inject  as directed Every 30 (Thirty) Days.     • folic acid (FOLVITE) 1 MG tablet Take 1 mg by mouth Every Night.     • glipizide (GLUCOTROL) 10 MG tablet Take 10 mg by mouth Every Night.     • insulin glargine (LANTUS) 100 UNIT/ML injection Inject 90 Units under the skin into the appropriate area as directed Every Night.     • metoprolol succinate XL (TOPROL-XL) 50 MG 24 hr tablet Take 25 mg by mouth 2 (two) times a day.     • omeprazole (priLOSEC) 20 MG capsule Take 20 mg by mouth Every Night.       No current facility-administered medications on file prior to visit.         ALLERGIES:  No Known Allergies     Social History     Socioeconomic History   • Marital status:      Spouse name: Not on file   • Number of children: Not on file   • Years of education: Not on file   • Highest education level: Not on file   Tobacco Use   • Smoking status: Never Smoker   • Smokeless tobacco: Former User   Substance and Sexual Activity   • Alcohol use: Yes     Alcohol/week: 15.0 standard drinks     Types: 15 Cans of beer per week     Frequency: 2-3 times a week   • Drug use: No   • Sexual activity: Defer        Family History   Problem Relation Age of Onset   • Hypertension Mother    • Heart disease Father    • Hypertension Father    • Cancer Brother    • Cancer Paternal Aunt    • Diabetes Paternal Aunt    • Cancer Paternal Grandmother    • Heart disease Paternal Grandfather    • No Known Problems Sister    • No Known Problems Sister    • Autism Son         Review of Systems       CONSTITUTIONAL:fatigue +  No weight loss, fever, chills,  "weakness.  HEENT: Eyes: No visual loss, blurred vision, double vision or yellow sclerae. Ears, Nose, Throat: No hearing loss, sneezing, congestion, runny nose or sore throat.  SKIN: No rash or itching.  CARDIOVASCULAR: No chest pain, chest pressure or chest discomfort. No palpitations or edema.  RESPIRATORY: No shortness of breath, cough or sputum.  GASTROINTESTINAL: No anorexia, nausea, vomiting or diarrhea. No abdominal pain or blood.  GENITOURINARY: Negative for urgency, frequency or dysuria.   NEUROLOGICAL: No headache, dizziness, syncope, paralysis, ataxia, numbness or tingling in the extremities. No change in bowel or bladder control.  MUSCULOSKELETAL: chronic joint pain + .  HEMATOLOGIC: No anemia, bleeding or bruising.  LYMPHATICS: No enlarged nodes. No history of splenectomy.  PSYCHIATRIC: No history of depression or anxiety.  ENDOCRINOLOGIC: No reports of sweating, cold or heat intolerance. No polyuria or polydipsia.  ALLERGIES: No history of asthma, hives, eczema or rhinitis.      Objective     Vitals:    12/30/20 1321   BP: 151/100   Pulse: 89   Resp: 18   Temp: 97.1 °F (36.2 °C)   Weight: 116 kg (256 lb)   Height: 172.7 cm (68\")   PainSc: 0-No pain     Current Status 12/30/2020   ECOG score 0       Physical Exam      GENERAL: Alert, awake, oriented.  Well dressed.  Not in apparent distress. Vitals as above.   HEAD: Normocephalic, atraumatic.   EYES: PERRL, EOMI. vision is grossly intact.  NECK: Supple, no adenopathy or thyromegaly.   THROAT: Normal oral cavity and pharynx. No inflammation, swelling, exudate, or lesions.  CARDIAC: Normal S1 and S2. No S3, S4 or murmurs. Rhythm is regular.  Extremities are warm and well perfused.   LUNGS: Clear to auscultation and percussion without rales, rhonchi, wheezing or diminished breath sounds.  ABDOMEN: Positive bowel sounds. Soft, nondistended, nontender. No guarding or rebound. No masses.  BACK:  No bony tenderness.   EXTREMITIES: No significant deformity or " joint abnormality.  Peripheral pulses intact. No varicosities.  SKIN: No rash or bruising.  NEUROLOGICAL: Grossly non-focal exam. No focal weakness. Gait: Normal.   PSYCH: Mood and affect normal. No hallucination or suicidal thoughts.   LYMPHATIC: No cervical, supraclavicular or axillary adenopathy.       RECENT LABS:Independently reviewed and summarized  Hematology WBC   Date Value Ref Range Status   12/21/2020 8.15 3.40 - 10.80 10*3/mm3 Final     RBC   Date Value Ref Range Status   12/21/2020 5.08 4.14 - 5.80 10*6/mm3 Final     Hemoglobin   Date Value Ref Range Status   12/21/2020 15.6 13.0 - 17.7 g/dL Final     Hematocrit   Date Value Ref Range Status   12/21/2020 45.0 37.5 - 51.0 % Final     Platelets   Date Value Ref Range Status   12/21/2020 40 (C) 140 - 450 10*3/mm3 Final          Pathology:   Bone marrow aspiration/biopsy from 1/22/15 showed normocellular marrow with normal megakaryocytes.     I have reviewed old records and summarized them in HPI as well as assessment and plan section of this note.       Rubén Maki reports a pain score of 0.  Given his pain assessment as noted, treatment options were discussed and the following options were decided upon as a follow-up plan to address the patient's pain: continuation of current treatment plan for pain.    Patient screened positive for depression based on a PHQ-9 score of 0 on 12/30/2020. Follow-up recommendations include: Suicide Risk Assessment performed.        Diagnosis:   (1) Thrombocytopenia   (2) Cirrhosis of liver  (3) Morbid obesity     All are new diagnosis/problems for me.     Assessment/Plan       (1) Thrombocytopenia      Result of bone marrow , imaging, old records records.   Likely his thrombocytopenia is secondary to cirrhosis of liver and alcohol abuse.   His ultrasound showed cirrhosis of liver and splenomegaly.   Bone marrow was negative for any hematological disorder.   Denies any symptomatic bleeding.   Recommend continue  monitoring his platelets every 6 months.     (2) Cirrhosis of liver  Likely combination of fatty liver disease and alcohol.   Recommend he needs to stop drinking completely. He drinks beer 3-4 days in a week - 4 beers in a day.   He tells me he drank heavily in his younger age.   Recommend he stop drinking.   Recommend weight loss.   Recommend tight glycemic control.     (3) Morbid obesity   Body mass index is 38.92 kg/m².  Counseled about diet, exercise and weight loss.     I have spent > 45 minutes times face to face with the patient and more than 50% of time was spent on counseling/coordinating care.

## 2021-04-22 ENCOUNTER — LAB (OUTPATIENT)
Dept: LAB | Facility: HOSPITAL | Age: 56
End: 2021-04-22

## 2021-04-22 DIAGNOSIS — K75.81 NASH (NONALCOHOLIC STEATOHEPATITIS): ICD-10-CM

## 2021-04-22 DIAGNOSIS — R74.8 ELEVATED LIVER ENZYMES: ICD-10-CM

## 2021-04-22 LAB
ALBUMIN SERPL-MCNC: 4.4 G/DL (ref 3.5–5.2)
ALBUMIN/GLOB SERPL: 1.4 G/DL
ALP SERPL-CCNC: 114 U/L (ref 39–117)
ALT SERPL W P-5'-P-CCNC: 31 U/L (ref 1–41)
ANION GAP SERPL CALCULATED.3IONS-SCNC: 12.4 MMOL/L (ref 5–15)
AST SERPL-CCNC: 25 U/L (ref 1–40)
BASOPHILS # BLD MANUAL: 0.08 10*3/MM3 (ref 0–0.2)
BASOPHILS NFR BLD AUTO: 1 % (ref 0–1.5)
BILIRUB SERPL-MCNC: 1.3 MG/DL (ref 0–1.2)
BUN SERPL-MCNC: 11 MG/DL (ref 6–20)
BUN/CREAT SERPL: 10.5 (ref 7–25)
CALCIUM SPEC-SCNC: 9.9 MG/DL (ref 8.6–10.5)
CHLORIDE SERPL-SCNC: 99 MMOL/L (ref 98–107)
CO2 SERPL-SCNC: 27.6 MMOL/L (ref 22–29)
CREAT SERPL-MCNC: 1.05 MG/DL (ref 0.76–1.27)
DEPRECATED RDW RBC AUTO: 43.7 FL (ref 37–54)
EOSINOPHIL # BLD MANUAL: 0.23 10*3/MM3 (ref 0–0.4)
EOSINOPHIL NFR BLD MANUAL: 3.1 % (ref 0.3–6.2)
ERYTHROCYTE [DISTWIDTH] IN BLOOD BY AUTOMATED COUNT: 13.6 % (ref 12.3–15.4)
GFR SERPL CREATININE-BSD FRML MDRD: 74 ML/MIN/1.73
GIANT PLATELETS: NORMAL
GLOBULIN UR ELPH-MCNC: 3.1 GM/DL
GLUCOSE SERPL-MCNC: 122 MG/DL (ref 65–99)
HCT VFR BLD AUTO: 51.1 % (ref 37.5–51)
HGB BLD-MCNC: 17.5 G/DL (ref 13–17.7)
INR PPP: 1.01 (ref 0.8–1.2)
LYMPHOCYTES # BLD MANUAL: 1.93 10*3/MM3 (ref 0.7–3.1)
LYMPHOCYTES NFR BLD MANUAL: 25.5 % (ref 19.6–45.3)
LYMPHOCYTES NFR BLD MANUAL: 7.1 % (ref 5–12)
MCH RBC QN AUTO: 30.6 PG (ref 26.6–33)
MCHC RBC AUTO-ENTMCNC: 34.2 G/DL (ref 31.5–35.7)
MCV RBC AUTO: 89.3 FL (ref 79–97)
MONOCYTES # BLD AUTO: 0.54 10*3/MM3 (ref 0.1–0.9)
NEUTROPHILS # BLD AUTO: 4.78 10*3/MM3 (ref 1.7–7)
NEUTROPHILS NFR BLD MANUAL: 63.3 % (ref 42.7–76)
PLATELET # BLD AUTO: 55 10*3/MM3 (ref 140–450)
PMV BLD AUTO: 13.6 FL (ref 6–12)
POTASSIUM SERPL-SCNC: 4.3 MMOL/L (ref 3.5–5.2)
PROT SERPL-MCNC: 7.5 G/DL (ref 6–8.5)
PROTHROMBIN TIME: 13.7 SECONDS (ref 11.1–15.3)
RBC # BLD AUTO: 5.72 10*6/MM3 (ref 4.14–5.8)
RBC MORPH BLD: NORMAL
SODIUM SERPL-SCNC: 139 MMOL/L (ref 136–145)
WBC # BLD AUTO: 7.55 10*3/MM3 (ref 3.4–10.8)
WBC MORPH BLD: NORMAL

## 2021-04-22 PROCEDURE — 82465 ASSAY BLD/SERUM CHOLESTEROL: CPT

## 2021-04-22 PROCEDURE — 85007 BL SMEAR W/DIFF WBC COUNT: CPT

## 2021-04-22 PROCEDURE — 80053 COMPREHEN METABOLIC PANEL: CPT

## 2021-04-22 PROCEDURE — 85610 PROTHROMBIN TIME: CPT

## 2021-04-22 PROCEDURE — 84478 ASSAY OF TRIGLYCERIDES: CPT

## 2021-04-22 PROCEDURE — 83883 ASSAY NEPHELOMETRY NOT SPEC: CPT

## 2021-04-22 PROCEDURE — 85025 COMPLETE CBC W/AUTO DIFF WBC: CPT

## 2021-04-22 PROCEDURE — 83010 ASSAY OF HAPTOGLOBIN QUANT: CPT

## 2021-04-22 PROCEDURE — 36415 COLL VENOUS BLD VENIPUNCTURE: CPT

## 2021-04-22 PROCEDURE — 82977 ASSAY OF GGT: CPT

## 2021-04-22 PROCEDURE — 82172 ASSAY OF APOLIPOPROTEIN: CPT

## 2021-04-24 LAB
A2 MACROGLOB SERPL-MCNC: 283 MG/DL (ref 110–276)
ALT SERPL W P-5'-P-CCNC: 33 IU/L (ref 0–55)
APO A-I SERPL-MCNC: 143 MG/DL (ref 101–178)
AST SERPL W P-5'-P-CCNC: 35 IU/L (ref 0–40)
BILIRUB SERPL-MCNC: 1.1 MG/DL (ref 0–1.2)
CHOLEST SERPL-MCNC: 111 MG/DL (ref 100–199)
FIBROSIS SCORING:: ABNORMAL
FIBROSIS STAGE SERPL QL: ABNORMAL
GGT SERPL-CCNC: 39 IU/L (ref 0–65)
GLUCOSE SERPL-MCNC: 119 MG/DL (ref 65–99)
HAPTOGLOB SERPL-MCNC: 137 MG/DL (ref 29–370)
INTERPRETATIONS: (REFERENCE): ABNORMAL
LABORATORY COMMENT REPORT: ABNORMAL
LIVER FIBR SCORE SERPL CALC.FIBROSURE: 0.56 (ref 0–0.21)
NASH SCORING (REFERENCE): ABNORMAL
NECROINFLAMMATORY ACT GRADE SERPL QL: ABNORMAL
NECROINFLAMMATORY ACT SCORE SERPL: 0.5
SERVICE CMNT-IMP: ABNORMAL
STEATOSIS GRADE (REFERENCE): ABNORMAL
STEATOSIS GRADING (REFERENCE): ABNORMAL
STEATOSIS SCORE (REFERENCE): 0.49 (ref 0–0.3)
TRIGL SERPL-MCNC: 165 MG/DL (ref 0–149)

## 2021-12-08 ENCOUNTER — HOSPITAL ENCOUNTER (OUTPATIENT)
Facility: HOSPITAL | Age: 56
Setting detail: OBSERVATION
Discharge: ANOTHER HOSPITAL | End: 2021-12-09
Attending: STUDENT IN AN ORGANIZED HEALTH CARE EDUCATION/TRAINING PROGRAM | Admitting: STUDENT IN AN ORGANIZED HEALTH CARE EDUCATION/TRAINING PROGRAM

## 2021-12-08 DIAGNOSIS — D69.6 THROMBOCYTOPENIA (HCC): ICD-10-CM

## 2021-12-08 DIAGNOSIS — M65.9 TENOSYNOVITIS: Primary | ICD-10-CM

## 2021-12-08 LAB
ANION GAP SERPL CALCULATED.3IONS-SCNC: 12 MMOL/L (ref 5–15)
BUN SERPL-MCNC: 19 MG/DL (ref 6–20)
BUN/CREAT SERPL: 17.8 (ref 7–25)
CALCIUM SPEC-SCNC: 9.6 MG/DL (ref 8.6–10.5)
CHLORIDE SERPL-SCNC: 102 MMOL/L (ref 98–107)
CO2 SERPL-SCNC: 23 MMOL/L (ref 22–29)
CREAT SERPL-MCNC: 1.07 MG/DL (ref 0.76–1.27)
D-LACTATE SERPL-SCNC: 1.5 MMOL/L (ref 0.5–2)
DEPRECATED RDW RBC AUTO: 37.4 FL (ref 37–54)
ERYTHROCYTE [DISTWIDTH] IN BLOOD BY AUTOMATED COUNT: 12.4 % (ref 12.3–15.4)
GFR SERPL CREATININE-BSD FRML MDRD: 72 ML/MIN/1.73
GLUCOSE SERPL-MCNC: 244 MG/DL (ref 65–99)
HCT VFR BLD AUTO: 43 % (ref 37.5–51)
HGB BLD-MCNC: 16.2 G/DL (ref 13–17.7)
MCH RBC QN AUTO: 31.3 PG (ref 26.6–33)
MCHC RBC AUTO-ENTMCNC: 37.7 G/DL (ref 31.5–35.7)
MCV RBC AUTO: 83.2 FL (ref 79–97)
PLATELET # BLD AUTO: 22 10*3/MM3 (ref 140–450)
PMV BLD AUTO: 13.9 FL (ref 6–12)
POTASSIUM SERPL-SCNC: 4.1 MMOL/L (ref 3.5–5.2)
RBC # BLD AUTO: 5.17 10*6/MM3 (ref 4.14–5.8)
SODIUM SERPL-SCNC: 137 MMOL/L (ref 136–145)
WBC NRBC COR # BLD: 11.3 10*3/MM3 (ref 3.4–10.8)

## 2021-12-08 PROCEDURE — 85007 BL SMEAR W/DIFF WBC COUNT: CPT | Performed by: STUDENT IN AN ORGANIZED HEALTH CARE EDUCATION/TRAINING PROGRAM

## 2021-12-08 PROCEDURE — 80048 BASIC METABOLIC PNL TOTAL CA: CPT | Performed by: STUDENT IN AN ORGANIZED HEALTH CARE EDUCATION/TRAINING PROGRAM

## 2021-12-08 PROCEDURE — 36415 COLL VENOUS BLD VENIPUNCTURE: CPT

## 2021-12-08 PROCEDURE — 87040 BLOOD CULTURE FOR BACTERIA: CPT | Performed by: STUDENT IN AN ORGANIZED HEALTH CARE EDUCATION/TRAINING PROGRAM

## 2021-12-08 PROCEDURE — 83605 ASSAY OF LACTIC ACID: CPT | Performed by: STUDENT IN AN ORGANIZED HEALTH CARE EDUCATION/TRAINING PROGRAM

## 2021-12-08 PROCEDURE — 25010000002 CEFAZOLIN PER 500 MG: Performed by: STUDENT IN AN ORGANIZED HEALTH CARE EDUCATION/TRAINING PROGRAM

## 2021-12-08 PROCEDURE — 87636 SARSCOV2 & INF A&B AMP PRB: CPT | Performed by: STUDENT IN AN ORGANIZED HEALTH CARE EDUCATION/TRAINING PROGRAM

## 2021-12-08 PROCEDURE — 99284 EMERGENCY DEPT VISIT MOD MDM: CPT

## 2021-12-08 PROCEDURE — 85025 COMPLETE CBC W/AUTO DIFF WBC: CPT | Performed by: STUDENT IN AN ORGANIZED HEALTH CARE EDUCATION/TRAINING PROGRAM

## 2021-12-08 PROCEDURE — 96365 THER/PROPH/DIAG IV INF INIT: CPT

## 2021-12-08 PROCEDURE — C9803 HOPD COVID-19 SPEC COLLECT: HCPCS

## 2021-12-08 RX ORDER — BUPIVACAINE HCL/0.9 % NACL/PF 0.1 %
2 PLASTIC BAG, INJECTION (ML) EPIDURAL ONCE
Status: COMPLETED | OUTPATIENT
Start: 2021-12-08 | End: 2021-12-09

## 2021-12-08 RX ADMIN — CEFAZOLIN 2 G: 10 INJECTION, POWDER, FOR SOLUTION INTRAVENOUS at 23:21

## 2021-12-09 VITALS
HEART RATE: 86 BPM | TEMPERATURE: 97.7 F | HEIGHT: 68 IN | BODY MASS INDEX: 37.59 KG/M2 | DIASTOLIC BLOOD PRESSURE: 95 MMHG | RESPIRATION RATE: 20 BRPM | OXYGEN SATURATION: 97 % | WEIGHT: 248 LBS | SYSTOLIC BLOOD PRESSURE: 156 MMHG

## 2021-12-09 LAB
BASOPHILS # BLD MANUAL: 0.11 10*3/MM3 (ref 0–0.2)
BASOPHILS NFR BLD MANUAL: 1 % (ref 0–1.5)
FLUAV SUBTYP SPEC NAA+PROBE: NOT DETECTED
FLUBV RNA ISLT QL NAA+PROBE: NOT DETECTED
LYMPHOCYTES # BLD MANUAL: 2.71 10*3/MM3 (ref 0.7–3.1)
LYMPHOCYTES NFR BLD MANUAL: 6 % (ref 5–12)
MONOCYTES # BLD: 0.68 10*3/MM3 (ref 0.1–0.9)
NEUTROPHILS # BLD AUTO: 7.8 10*3/MM3 (ref 1.7–7)
NEUTROPHILS NFR BLD MANUAL: 69 % (ref 42.7–76)
RBC MORPH BLD: NORMAL
SARS-COV-2 RNA PNL SPEC NAA+PROBE: NOT DETECTED
SMALL PLATELETS BLD QL SMEAR: ABNORMAL
VARIANT LYMPHS NFR BLD MANUAL: 24 % (ref 19.6–45.3)
WBC MORPH BLD: NORMAL

## 2021-12-09 PROCEDURE — G0378 HOSPITAL OBSERVATION PER HR: HCPCS

## 2021-12-09 PROCEDURE — 25010000002 HYDROMORPHONE 1 MG/ML SOLUTION: Performed by: STUDENT IN AN ORGANIZED HEALTH CARE EDUCATION/TRAINING PROGRAM

## 2021-12-09 PROCEDURE — 96375 TX/PRO/DX INJ NEW DRUG ADDON: CPT

## 2021-12-09 RX ADMIN — HYDROMORPHONE HYDROCHLORIDE 1 MG: 1 INJECTION, SOLUTION INTRAMUSCULAR; INTRAVENOUS; SUBCUTANEOUS at 00:16

## 2021-12-09 NOTE — ED PROVIDER NOTES
Subjective   56-year-old male comes to the ER chief complaint of left index finger swelling, pain, redness that got worse over the last couple of days.  Patient was bitten multiple times by their cat.  Patient had a tetanus couple of weeks ago.  Patient states that the cat also had his rabies shot over the last month.  No fevers or chills.  Patient has pain with movement of his finger.      History provided by:  Patient   used: No        Review of Systems   Constitutional: Negative for chills and fever.   Respiratory: Negative for cough and shortness of breath.    Gastrointestinal: Negative for nausea.   Musculoskeletal:        Finger pain   Skin: Positive for rash and wound. Negative for color change.   Neurological: Negative for dizziness and headaches.       Past Medical History:   Diagnosis Date   • Diabetes mellitus (HCC)    • Elevated liver enzymes    • Elevated liver enzymes    • Gall stones    • GERD (gastroesophageal reflux disease)    • GERD (gastroesophageal reflux disease)    • Hypertension    • LINDSEY (nonalcoholic steatohepatitis)    • LINDSEY (nonalcoholic steatohepatitis)    • Thrombocytopenia (HCC)        No Known Allergies    Past Surgical History:   Procedure Laterality Date   • GALLBLADDER SURGERY     • GASTRIC BYPASS     • SHOULDER SURGERY     • SKIN GRAFT         Family History   Problem Relation Age of Onset   • Hypertension Mother    • Heart disease Father    • Hypertension Father    • Cancer Brother    • Cancer Paternal Aunt    • Diabetes Paternal Aunt    • Cancer Paternal Grandmother    • Heart disease Paternal Grandfather    • No Known Problems Sister    • No Known Problems Sister    • Autism Son        Social History     Socioeconomic History   • Marital status:    Tobacco Use   • Smoking status: Never Smoker   • Smokeless tobacco: Former User   Substance and Sexual Activity   • Alcohol use: Yes     Alcohol/week: 15.0 standard drinks     Types: 15 Cans of beer per  week   • Drug use: No   • Sexual activity: Defer           Objective    Vitals:    12/08/21 2242 12/08/21 2302 12/08/21 2331 12/09/21 0033   BP: 141/89 169/95 155/85 135/68   BP Location:       Patient Position:       Pulse: 80 84 82 82   Resp:       Temp:       TempSrc:       SpO2: 97% 97% 98% 96%   Weight:       Height:           Physical Exam  Vitals and nursing note reviewed.   Constitutional:       General: He is not in acute distress.     Appearance: He is well-developed. He is obese. He is not ill-appearing or diaphoretic.   HENT:      Head: Normocephalic.   Eyes:      Conjunctiva/sclera: Conjunctivae normal.   Pulmonary:      Effort: Pulmonary effort is normal. No accessory muscle usage or respiratory distress.   Musculoskeletal:      Comments: Left second digit swelling and erythema.  Mild flexion of the finger due to pain.  Unable to extend due to excruciating pain.  Forearm is tender as well along the flexor and extensor tendon sheath.  Multiple Puncture marks on the dorsal aspect, medial and lateral aspect of the finger.   Skin:     General: Skin is dry.      Findings: Erythema and rash present.      Comments: Swelling of the left second digit with erythema and erythematous stranding/streaking extending up the forearm on the dorsal and volar aspect.   Neurological:      Mental Status: He is alert.   Psychiatric:         Behavior: Behavior normal.         Procedures           ED Course  ED Course as of 12/09/21 0040   Wed Dec 08, 2021   2353 Spoke with the [facility] in [place] who was unable to accept transfer due to lack of beds. [BH]      ED Course User Index  [BH] Juan Carlos Ng MD      Results for orders placed or performed during the hospital encounter of 12/08/21   COVID-19 and FLU A/B PCR - Swab, Nasopharynx    Specimen: Nasopharynx; Swab   Result Value Ref Range    COVID19 Not Detected Not Detected - Ref. Range    Influenza A PCR Not Detected Not Detected    Influenza B PCR Not Detected Not  Detected   Basic Metabolic Panel    Specimen: Blood   Result Value Ref Range    Glucose 244 (H) 65 - 99 mg/dL    BUN 19 6 - 20 mg/dL    Creatinine 1.07 0.76 - 1.27 mg/dL    Sodium 137 136 - 145 mmol/L    Potassium 4.1 3.5 - 5.2 mmol/L    Chloride 102 98 - 107 mmol/L    CO2 23.0 22.0 - 29.0 mmol/L    Calcium 9.6 8.6 - 10.5 mg/dL    eGFR Non African Amer 72 >60 mL/min/1.73    BUN/Creatinine Ratio 17.8 7.0 - 25.0    Anion Gap 12.0 5.0 - 15.0 mmol/L   CBC Auto Differential    Specimen: Blood   Result Value Ref Range    WBC 11.30 (H) 3.40 - 10.80 10*3/mm3    RBC 5.17 4.14 - 5.80 10*6/mm3    Hemoglobin 16.2 13.0 - 17.7 g/dL    Hematocrit 43.0 37.5 - 51.0 %    MCV 83.2 79.0 - 97.0 fL    MCH 31.3 26.6 - 33.0 pg    MCHC 37.7 (H) 31.5 - 35.7 g/dL    RDW 12.4 12.3 - 15.4 %    RDW-SD 37.4 37.0 - 54.0 fl    MPV 13.9 (H) 6.0 - 12.0 fL    Platelets 22 (C) 140 - 450 10*3/mm3   Lactic Acid, Plasma    Specimen: Blood   Result Value Ref Range    Lactate 1.5 0.5 - 2.0 mmol/L   Manual Differential    Specimen: Blood   Result Value Ref Range    Neutrophil % 69.0 42.7 - 76.0 %    Lymphocyte % 24.0 19.6 - 45.3 %    Monocyte % 6.0 5.0 - 12.0 %    Basophil % 1.0 0.0 - 1.5 %    Neutrophils Absolute 7.80 (H) 1.70 - 7.00 10*3/mm3    Lymphocytes Absolute 2.71 0.70 - 3.10 10*3/mm3    Monocytes Absolute 0.68 0.10 - 0.90 10*3/mm3    Basophils Absolute 0.11 0.00 - 0.20 10*3/mm3    RBC Morphology Normal Normal    WBC Morphology Normal Normal    Platelet Estimate Decreased Normal                MDM  Number of Diagnoses or Management Options  Tenosynovitis: new and requires workup  Thrombocytopenia (HCC): new and requires workup  Diagnosis management comments: Vital signs are stable, afebrile.  Lab significant for platelets of 22.  Patient has a history of thrombocytopenia.  White counts 11 with a lactate of 1.5.  Cultures pending.  Patient received Ancef.  Orthopedic surgery consulted and is concerned about flexor tenosynovitis and recommended  transfer.  Antibiotics started.  Spoke with [facility] in [place] who accepted the patient to their ER.       Amount and/or Complexity of Data Reviewed  Decide to obtain previous medical records or to obtain history from someone other than the patient: yes        Final diagnoses:   Tenosynovitis   Thrombocytopenia (HCC)       ED Disposition  ED Disposition     ED Disposition Condition Comment    Transfer to Another Facility   Level of Care: Telemetry [5]  Diagnosis: Tenosynovitis [206220]  Admitting Physician: EDWARD MOHAN [[id number]]  Attending Physician: EDWARD MOHAN [[id number]]            No follow-up provider specified.       Medication List      No changes were made to your prescriptions during this visit.          Juan Carlos Ng MD  12/09/21 0040

## 2021-12-13 LAB
BACTERIA SPEC AEROBE CULT: NORMAL
BACTERIA SPEC AEROBE CULT: NORMAL

## 2022-03-23 ENCOUNTER — APPOINTMENT (OUTPATIENT)
Dept: GENERAL RADIOLOGY | Facility: HOSPITAL | Age: 57
End: 2022-03-23

## 2022-03-23 ENCOUNTER — HOSPITAL ENCOUNTER (EMERGENCY)
Facility: HOSPITAL | Age: 57
Discharge: HOME | End: 2022-03-23
Attending: FAMILY MEDICINE | Admitting: FAMILY MEDICINE

## 2022-03-23 VITALS
RESPIRATION RATE: 16 BRPM | OXYGEN SATURATION: 99 % | TEMPERATURE: 98.1 F | HEART RATE: 98 BPM | DIASTOLIC BLOOD PRESSURE: 92 MMHG | BODY MASS INDEX: 39.4 KG/M2 | WEIGHT: 260 LBS | SYSTOLIC BLOOD PRESSURE: 177 MMHG | HEIGHT: 68 IN

## 2022-03-23 DIAGNOSIS — D69.6 THROMBOCYTOPENIA: ICD-10-CM

## 2022-03-23 DIAGNOSIS — R00.2 PALPITATIONS: Primary | ICD-10-CM

## 2022-03-23 LAB
ALBUMIN SERPL-MCNC: 4.5 G/DL (ref 3.5–5.2)
ALBUMIN/GLOB SERPL: 1.7 G/DL
ALP SERPL-CCNC: 95 U/L (ref 39–117)
ALT SERPL W P-5'-P-CCNC: 24 U/L (ref 1–41)
ANION GAP SERPL CALCULATED.3IONS-SCNC: 10 MMOL/L (ref 5–15)
AST SERPL-CCNC: 24 U/L (ref 1–40)
BASOPHILS # BLD AUTO: 0.09 10*3/MM3 (ref 0–0.2)
BASOPHILS NFR BLD AUTO: 1.1 % (ref 0–1.5)
BILIRUB SERPL-MCNC: 1.8 MG/DL (ref 0–1.2)
BUN SERPL-MCNC: 12 MG/DL (ref 6–20)
BUN/CREAT SERPL: 12.4 (ref 7–25)
CALCIUM SPEC-SCNC: 9.6 MG/DL (ref 8.6–10.5)
CHLORIDE SERPL-SCNC: 101 MMOL/L (ref 98–107)
CO2 SERPL-SCNC: 24 MMOL/L (ref 22–29)
CREAT SERPL-MCNC: 0.97 MG/DL (ref 0.76–1.27)
DEPRECATED RDW RBC AUTO: 40.2 FL (ref 37–54)
EGFRCR SERPLBLD CKD-EPI 2021: 92.8 ML/MIN/1.73
EOSINOPHIL # BLD AUTO: 0.15 10*3/MM3 (ref 0–0.4)
EOSINOPHIL # BLD MANUAL: 0.08 10*3/MM3 (ref 0–0.4)
EOSINOPHIL NFR BLD AUTO: 1.9 % (ref 0.3–6.2)
EOSINOPHIL NFR BLD MANUAL: 1 % (ref 0.3–6.2)
ERYTHROCYTE [DISTWIDTH] IN BLOOD BY AUTOMATED COUNT: 13 % (ref 12.3–15.4)
GLOBULIN UR ELPH-MCNC: 2.6 GM/DL
GLUCOSE SERPL-MCNC: 218 MG/DL (ref 65–99)
HCT VFR BLD AUTO: 44.9 % (ref 37.5–51)
HGB BLD-MCNC: 16.5 G/DL (ref 13–17.7)
HOLD SPECIMEN: NORMAL
HOLD SPECIMEN: NORMAL
IMM GRANULOCYTES # BLD AUTO: 0.03 10*3/MM3 (ref 0–0.05)
IMM GRANULOCYTES NFR BLD AUTO: 0.4 % (ref 0–0.5)
LYMPHOCYTES # BLD AUTO: 1.98 10*3/MM3 (ref 0.7–3.1)
LYMPHOCYTES # BLD MANUAL: 2.36 10*3/MM3 (ref 0.7–3.1)
LYMPHOCYTES NFR BLD AUTO: 25.2 % (ref 19.6–45.3)
LYMPHOCYTES NFR BLD MANUAL: 8 % (ref 5–12)
MAGNESIUM SERPL-MCNC: 1.8 MG/DL (ref 1.6–2.6)
MCH RBC QN AUTO: 32 PG (ref 26.6–33)
MCHC RBC AUTO-ENTMCNC: 36.7 G/DL (ref 31.5–35.7)
MCV RBC AUTO: 87 FL (ref 79–97)
MONOCYTES # BLD AUTO: 0.66 10*3/MM3 (ref 0.1–0.9)
MONOCYTES # BLD: 0.63 10*3/MM3 (ref 0.1–0.9)
MONOCYTES NFR BLD AUTO: 8.4 % (ref 5–12)
NEUTROPHILS # BLD AUTO: 4.79 10*3/MM3 (ref 1.7–7)
NEUTROPHILS NFR BLD AUTO: 4.95 10*3/MM3 (ref 1.7–7)
NEUTROPHILS NFR BLD AUTO: 63 % (ref 42.7–76)
NEUTROPHILS NFR BLD MANUAL: 59 % (ref 42.7–76)
NEUTS BAND NFR BLD MANUAL: 2 % (ref 0–5)
NRBC BLD AUTO-RTO: 0 /100 WBC (ref 0–0.2)
PLATELET # BLD AUTO: 21 10*3/MM3 (ref 140–450)
PMV BLD AUTO: 12.7 FL (ref 6–12)
POTASSIUM SERPL-SCNC: 3.9 MMOL/L (ref 3.5–5.2)
PROT SERPL-MCNC: 7.1 G/DL (ref 6–8.5)
RBC # BLD AUTO: 5.16 10*6/MM3 (ref 4.14–5.8)
RBC MORPH BLD: NORMAL
SMALL PLATELETS BLD QL SMEAR: NORMAL
SODIUM SERPL-SCNC: 135 MMOL/L (ref 136–145)
TROPONIN T SERPL-MCNC: <0.01 NG/ML (ref 0–0.03)
TSH SERPL DL<=0.05 MIU/L-ACNC: 1.58 UIU/ML (ref 0.27–4.2)
VARIANT LYMPHS NFR BLD MANUAL: 2 % (ref 0–5)
VARIANT LYMPHS NFR BLD MANUAL: 28 % (ref 19.6–45.3)
WBC MORPH BLD: NORMAL
WBC NRBC COR # BLD: 7.86 10*3/MM3 (ref 3.4–10.8)
WHOLE BLOOD HOLD SPECIMEN: NORMAL
WHOLE BLOOD HOLD SPECIMEN: NORMAL

## 2022-03-23 PROCEDURE — 83735 ASSAY OF MAGNESIUM: CPT

## 2022-03-23 PROCEDURE — 99283 EMERGENCY DEPT VISIT LOW MDM: CPT

## 2022-03-23 PROCEDURE — 85025 COMPLETE CBC W/AUTO DIFF WBC: CPT

## 2022-03-23 PROCEDURE — 85007 BL SMEAR W/DIFF WBC COUNT: CPT | Performed by: FAMILY MEDICINE

## 2022-03-23 PROCEDURE — 71045 X-RAY EXAM CHEST 1 VIEW: CPT

## 2022-03-23 PROCEDURE — 84484 ASSAY OF TROPONIN QUANT: CPT

## 2022-03-23 PROCEDURE — 93010 ELECTROCARDIOGRAM REPORT: CPT | Performed by: INTERNAL MEDICINE

## 2022-03-23 PROCEDURE — 84443 ASSAY THYROID STIM HORMONE: CPT | Performed by: FAMILY MEDICINE

## 2022-03-23 PROCEDURE — 80053 COMPREHEN METABOLIC PANEL: CPT

## 2022-03-23 PROCEDURE — 93005 ELECTROCARDIOGRAM TRACING: CPT | Performed by: FAMILY MEDICINE

## 2022-03-23 PROCEDURE — 93005 ELECTROCARDIOGRAM TRACING: CPT

## 2022-03-23 RX ORDER — METOPROLOL SUCCINATE 50 MG/1
50 TABLET, EXTENDED RELEASE ORAL 2 TIMES DAILY
Qty: 30 TABLET | Refills: 0 | Status: SHIPPED | OUTPATIENT
Start: 2022-03-23 | End: 2022-04-22

## 2022-03-23 RX ORDER — SODIUM CHLORIDE 0.9 % (FLUSH) 0.9 %
10 SYRINGE (ML) INJECTION AS NEEDED
Status: DISCONTINUED | OUTPATIENT
Start: 2022-03-23 | End: 2022-03-23 | Stop reason: HOSPADM

## 2022-03-23 NOTE — ED PROVIDER NOTES
Subjective   Patient presents to the emergency department with palpitations.  He has a history of SVT that was first diagnosed 2 years ago.  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  An EKG was performed with a heart rate of 200 and patient was seen in the [place] ER and given adenosine, cardioverted, and discharged home.  He states that his palpitations returned again today at 3 PM.  He currently takes Toprol 25 mg p.o. twice daily.  He admits to not taking this medication this past Saturday.  He has a history of thrombocytopenia for the past 10 years and has an appointment on April 4, 2022 with a hematologist at the [facility].          Palpitations  Palpitations quality:  Fast  Onset quality:  Sudden  Timing:  Constant  Progression:  Waxing and waning  Chronicity:  Recurrent  Relieved by:  Bed rest  Exacerbated by: activity.  Associated symptoms: shortness of breath    Associated symptoms: no chest pain, no cough, no diaphoresis, no dizziness, no nausea, no vomiting and no weakness        Review of Systems   Constitutional: Positive for activity change. Negative for appetite change, chills, diaphoresis, fatigue and fever.   HENT: Negative for congestion, ear discharge, ear pain, nosebleeds, rhinorrhea, sinus pressure, sore throat and trouble swallowing.    Eyes: Negative for discharge and redness.   Respiratory: Positive for shortness of breath. Negative for apnea, cough, chest tightness and wheezing.    Cardiovascular: Positive for palpitations. Negative for chest pain.   Gastrointestinal: Negative for abdominal pain, diarrhea, nausea and vomiting.   Endocrine: Negative for polyuria.   Genitourinary: Negative for dysuria, frequency and urgency.   Musculoskeletal: Negative for myalgias and neck pain.   Skin: Negative for color change and rash.   Allergic/Immunologic: Negative for immunocompromised state.    Neurological: Positive for light-headedness. Negative for dizziness, seizures, syncope, weakness and headaches.   Hematological: Negative for adenopathy. Does not bruise/bleed easily.   Psychiatric/Behavioral: Negative for behavioral problems and confusion.   All other systems reviewed and are negative.      Past Medical History:   Diagnosis Date   • Diabetes mellitus (HCC)    • Elevated liver enzymes    • Elevated liver enzymes    • Gall stones    • GERD (gastroesophageal reflux disease)    • GERD (gastroesophageal reflux disease)    • Hypertension    • LINDSEY (nonalcoholic steatohepatitis)    • LINDSEY (nonalcoholic steatohepatitis)    • Thrombocytopenia (HCC)        No Known Allergies    Past Surgical History:   Procedure Laterality Date   • GALLBLADDER SURGERY     • GASTRIC BYPASS     • SHOULDER SURGERY     • SKIN GRAFT         Family History   Problem Relation Age of Onset   • Hypertension Mother    • Heart disease Father    • Hypertension Father    • Cancer Brother    • Cancer Paternal Aunt    • Diabetes Paternal Aunt    • Cancer Paternal Grandmother    • Heart disease Paternal Grandfather    • No Known Problems Sister    • No Known Problems Sister    • Autism Son        Social History     Socioeconomic History   • Marital status:    Tobacco Use   • Smoking status: Never Smoker   • Smokeless tobacco: Former User     Quit date: 1/1/2000   Substance and Sexual Activity   • Alcohol use: Yes     Alcohol/week: 15.0 standard drinks     Types: 15 Cans of beer per week   • Drug use: No   • Sexual activity: Defer           Objective   Physical Exam  Vitals and nursing note reviewed.   Constitutional:       Appearance: He is well-developed.   HENT:      Head: Normocephalic and atraumatic.      Nose: Nose normal.   Eyes:      General: No scleral icterus.        Right eye: No discharge.         Left eye: No discharge.      Conjunctiva/sclera: Conjunctivae normal.      Pupils: Pupils are equal, round, and reactive to  light.   Neck:      Trachea: No tracheal deviation.   Cardiovascular:      Rate and Rhythm: Normal rate and regular rhythm.      Heart sounds: Normal heart sounds. No murmur heard.  Pulmonary:      Effort: Pulmonary effort is normal. No respiratory distress.      Breath sounds: Normal breath sounds. No stridor. No wheezing or rales.   Abdominal:      General: Bowel sounds are normal. There is no distension.      Palpations: Abdomen is soft. There is no mass.      Tenderness: There is no abdominal tenderness. There is no guarding or rebound.   Musculoskeletal:      Cervical back: Normal range of motion and neck supple.   Skin:     General: Skin is warm and dry.      Findings: No erythema or rash.   Neurological:      Mental Status: He is alert and oriented to person, place, and time.      Coordination: Coordination normal.   Psychiatric:         Behavior: Behavior normal.         Thought Content: Thought content normal.         ECG 12 Lead      Date/Time: 3/23/2022 7:20 PM  Performed by: Abraham Parker MD  Authorized by: Abraham Parker MD   Interpreted by physician  Rhythm: sinus rhythm  Rate: normal  BPM: 97  ST Segments: ST segments normal                   ED Course  ED Course as of 03/23/22 1920   Wed Mar 23, 2022   1906 Patient did not have any tachycardia in the emergency department.  His work-up was unremarkable other than his chronic thrombocytopenia.  He was advised to follow-up with cardiology and instructed to increase his metoprolol to 50 mg p.o. twice daily from 25 mg p.o. twice daily. [CB]      ED Course User Index  [CB] Abraham Parker MD             Labs Reviewed   COMPREHENSIVE METABOLIC PANEL - Abnormal; Notable for the following components:       Result Value    Glucose 218 (*)     Sodium 135 (*)     Total Bilirubin 1.8 (*)     All other components within normal limits    Narrative:     GFR Normal >60  Chronic Kidney Disease <60  Kidney Failure <15     CBC WITH AUTO DIFFERENTIAL  - Abnormal; Notable for the following components:    MCHC 36.7 (*)     MPV 12.7 (*)     Platelets 21 (*)     All other components within normal limits   MAGNESIUM - Normal   TROPONIN (IN-HOUSE) - Normal    Narrative:     Troponin T Reference Range:  <= 0.03 ng/mL-   Negative for AMI  >0.03 ng/mL-     Abnormal for myocardial necrosis.  Clinicians would have to utilize clinical acumen, EKG, Troponin and serial changes to determine if it is an Acute Myocardial Infarction or myocardial injury due to an underlying chronic condition.       Results may be falsely decreased if patient taking Biotin.     TSH - Normal   RAINBOW DRAW    Narrative:     The following orders were created for panel order [facility] [facility] Draw.  Procedure                               Abnormality         Status                     ---------                               -----------         ------                     Green Top (Gel)[[id number]]                                  Final result               Lavender Top[[id number]]                                     Final result               Gold Top - SST[[id number]]                                   Final result               Light Blue Top[[id number]]                                   Final result                 Please view results for these tests on the individual orders.   MANUAL DIFFERENTIAL   CBC AND DIFFERENTIAL    Narrative:     The following orders were created for panel order CBC & Differential.  Procedure                               Abnormality         Status                     ---------                               -----------         ------                     CBC Auto Differential[[id number]]        Abnormal            Final result               Scan Slide[[id number]]                                                                    Please view results for these tests on the individual orders.   GREEN TOP   LAVENDER TOP   GOLD TOP - SST   LIGHT BLUE TOP       XR Chest 1 View   Final Result      No acute findings in the chest.      Electronically signed by:  Letty Ghosh MD  3/23/2022 6:05 PM   CDT Workstation: [id number]                                                [facility]    Final diagnoses:   Palpitations   Thrombocytopenia (HCC)       ED Disposition  ED Disposition     ED Disposition   Discharge    Condition   Stable    Comment   --             Willard House MD  [address] [facility] [address]  [phone]    In 2 days      Maurice Pendleton MD  [address] [place] [address] [address]  [facility] [address] [phone]    In 2 days           Medication List      Changed    metoprolol succinate XL 50 MG 24 hr tablet  Commonly known as: TOPROL-XL  Take 1 tablet by mouth 2 (Two) Times a Day for 30 days.  What changed:   · how much to take  · when to take this           Where to Get Your Medications      These medications were sent to [id number] - [place], [place] - [address] [place] [address] AT [facility] [address] - [phone]  - [phone] [address] [address]    Phone: [phone]   · metoprolol succinate XL 50 MG 24 hr tablet          Abraham Parker MD  03/23/22 1907       Abraham Parker MD  03/23/22 1921

## 2022-03-23 NOTE — ED TRIAGE NOTES
"Pt presents to ED with complaints of \"fluttering\" in chest. Pt states he has a hx of SVT. Pt states last Saturday he has an episode of SVT and was gave adenosine in [place]. Pt states he has been also had some light headedness. VSS. NAD noted. A&OX4.  "

## 2022-04-03 LAB
QT INTERVAL: 370 MS
QTC INTERVAL: 469 MS

## 2022-05-16 ENCOUNTER — TRANSCRIBE ORDERS (OUTPATIENT)
Dept: PHYSICAL THERAPY | Facility: HOSPITAL | Age: 57
End: 2022-05-16

## 2022-05-16 DIAGNOSIS — M65.842 OTHER SYNOVITIS AND TENOSYNOVITIS, LEFT HAND: Primary | ICD-10-CM

## 2022-05-25 ENCOUNTER — HOSPITAL ENCOUNTER (OUTPATIENT)
Dept: OCCUPATIONAL THERAPY | Facility: HOSPITAL | Age: 57
Setting detail: THERAPIES SERIES
Discharge: HOME | End: 2022-05-25

## 2022-05-25 DIAGNOSIS — Z78.9 IMPAIRED MOBILITY AND ADLS: ICD-10-CM

## 2022-05-25 DIAGNOSIS — Z78.9 IMPAIRED INSTRUMENTAL ACTIVITIES OF DAILY LIVING (IADL): ICD-10-CM

## 2022-05-25 DIAGNOSIS — M65.9 SYNOVITIS AND TENOSYNOVITIS: Primary | ICD-10-CM

## 2022-05-25 DIAGNOSIS — Z74.09 IMPAIRED MOBILITY AND ADLS: ICD-10-CM

## 2022-05-25 PROCEDURE — 97166 OT EVAL MOD COMPLEX 45 MIN: CPT

## 2022-05-25 NOTE — THERAPY EVALUATION
Outpatient Occupational Therapy Ortho Initial Evaluation  [facility]     Patient Name: Rubén Maki  : 1965  MRN: [id number]  Today's Date: 2022      Visit Date: 2022    Visits: - initial OT evaluation   % Improvement: TBD  Re-cert Date: 6/15/2022  MD Appointment: TBD    Therapy Diagnosis: Synovitis & Tenosynovitis of L index finger- impaired ADLs/IADLs, decreased ROM, decreased  strength, pain, and impaired sensation.      Patient Active Problem List   Diagnosis   • SVT (supraventricular tachycardia) (HCC)   • REESE (acute kidney injury) (HCC)   • Thrombocytopenia (HCC)   • Nonalcoholic fatty liver disease   • Back pain   • Depression   • Essential hypertension   • Splenomegaly   • Morbidly obese (HCC)   • Tenosynovitis        Past Medical History:   Diagnosis Date   • Diabetes mellitus (HCC)    • Elevated liver enzymes    • Elevated liver enzymes    • Gall stones    • GERD (gastroesophageal reflux disease)    • GERD (gastroesophageal reflux disease)    • Hypertension    • LINDSEY (nonalcoholic steatohepatitis)    • LINDSEY (nonalcoholic steatohepatitis)    • Thrombocytopenia (HCC)         Past Surgical History:   Procedure Laterality Date   • GALLBLADDER SURGERY     • GASTRIC BYPASS     • SHOULDER SURGERY     • SKIN GRAFT           Visit Dx:    ICD-10-CM ICD-9-CM   1. Synovitis and tenosynovitis  M65.9 727.00   2. Impaired mobility and ADLs  Z74.09 V49.89    Z78.9    3. Impaired instrumental activities of daily living (IADL)  Z78.9 V49.89        Patient History     Row Name 22 1515             History    Chief Complaint Pain;Other 1 (comment)  decreased ROM  -KH      Type of Pain Hand pain  finger  -KH      Date Current Problem(s) Began 21  -      Brief Description of Current Complaint Pt is a 56 yr old male who presents to OT evaluation after a cat bite got infected on 2022. Pt reports he then had an I&D on 21 at [facility]. Reports he had no therapy after sx.  Pt biggest complain is pain and decreased ROM affecting ADLs/IADLs. Pt also reports decreased strength. Pt very ttp over scar.  -KH      Previous treatment for THIS PROBLEM Surgery  -      Surgery Date: 12/11/21  -KH      Patient/Caregiver Goals Relieve pain;Return to prior level of function;Improve mobility;Improve strength  -KH      Current Tobacco Use no  -KH      Smoking Status former  -KH      Patient's Rating of General Health Fair  -KH      Hand Dominance left-handed  -KH      Occupation/sports/leisure activities disability; volleyball   -KH      Patient seeing anyone else for problem(s)? n/a  -KH      What clinical tests have you had for this problem? X-ray  -      Related/Recent Hospitalizations Yes  -KH      Date of Hospitalization 12/11/21  -      Surgery/Hospitalization ~5 days  -KH      Location (Hospital Name) [facility]  -KH              Pain     Pain Location Finger (Comment which one)  L index  -KH      Pain at Present 9  -KH      Pain at Best 7  -KH      Pain at Worst 10  -KH      Pain Frequency Constant/continuous  -KH      Pain Description Burning;Aching;Stabbing  -KH      What Performance Factors Make the Current Problem(s) WORSE? weather  -KH      What Performance Factors Make the Current Problem(s) BETTER? warm weather; pain medicine  -KH      Is your sleep disturbed? Yes  -KH      Is medication used to assist with sleep? No  -KH      Total hours of sleep per night ~3-4hours  -KH      What position do you sleep in? Right sidelying;Left sidelying  -KH      Difficulties at work? retired  -KH      Difficulties with ADL's? tying shoes, opening containers, donning belt  -KH              Fall Risk Assessment    Any falls in the past year: No  -KH              Services    Prior Rehab/Home Health Experiences No  -KH      Are you currently receiving Home Health services No  -KH      Do you plan to receive Home Health services in the near future No  -KH              Daily Activities    Primary  "Language English  -              Safety    Are you being hurt, hit, or frightened by anyone at home or in your life? No  -KH      Are you being neglected by a caregiver No  -KH      Have you had any of the following issues with N/A  -            User Key  (r) = Recorded By, (t) = Taken By, (c) = Cosigned By    Initials Name Provider Type     Sindhu Emerson, OT Occupational Therapist                 OT Ortho     Row Name 05/25/22 1515             Sensation    Light Touch Partial deficits in the LUE  -      Monofilaments Tested? Blue  -      Blue Monofilament Interpretation Normal  -      Blue Monofilament Results pt reports more difficulty feeling L vs R (\"fuzzier\")  -              General ROM    GENERAL ROM COMMENTS ROM WFLs with exception of L hand. See hand section for details.  -            User Key  (r) = Recorded By, (t) = Taken By, (c) = Cosigned By    Initials Name Provider Type     Sindhu Emerson, OT Occupational Therapist                Hand Therapy (last 24 hours)     Hand Eval     Row Name 05/25/22 1515             Hand ROM Tested?    Hand ROM Tested? Left Flexion- AROM;Left Extension- AROM  -KH              Left Extension AROM    II- MP AROM 0  -KH      II- PIP AROM 30  lacking from 0  -KH      II- DIP AROM 5  lacking from 0  -KH      II- KANG Left Extension AROM 35  -KH      III- MP AROM 0  -KH      III- PIP AROM 0  -KH      III- DIP AROM 0  -KH      III- KANG Left Extension AROM 0  -KH      IV- MP AROM 0  -KH      IV- PIP AROM 0  -KH      IV- DIP AROM 0  -KH      IV- KANG Left Extension AROM 0  -KH      V- MP AROM 0  -KH      V- PIP AROM 0  -KH      V- DIP AROM 0  -KH      V- KANG Left Extension AROM 0  -KH              Left Flexion AROM    II- MP AROM 70  -KH      II- PIP AROM 50  -KH      II- DIP AROM 25  -KH      II- KANG Left Flexion AROM 145  -KH      III- MP AROM 85  -KH      III- PIP AROM 75  -KH      III- DIP AROM 70  -KH      III- KANG Left Flexion AROM 230  -KH      IV- MP " AROM 85  -KH      IV- PIP AROM 95  -KH      IV- DIP AROM 70  -KH      IV- KANG Left Flexion AROM 250  -KH      V- MP AROM 70  -KH      V- PIP AROM 92  -KH      V- DIP AROM 92  -KH      V- KANG Left Flexion AROM 254  -KH              Hand  Strength     Strength Affected Side Bilateral  -KH               Strength Right    Right  Test 1 65  -KH      Right  Test 2 60  -KH      Right  Test 3 45  -KH       Strength Average Right 56.67  -KH               Strength Left    Left  Test 1 20  -KH      Left  Test 2 25  -KH      Left  Test 3 20  -KH       Strength Average Left 21.67  -KH            User Key  (r) = Recorded By, (t) = Taken By, (c) = Cosigned By    Initials Name Provider Type    Sindhu Pearson, OT Occupational Therapist                   OT Goals     Row Name 05/25/22 1704 05/25/22 1515       OT Short Term Goals    STG 1 -- Pt will improve L index finger flex (AROM) by 10 deg or more of MCP, PIP, and DIP joints to improve IND/safety with ADLs/IADLs.  -    STG 1 Progress -- New  -    STG 2 -- Pt will decrease L index finger PIP ext to 20 deg or less lacking from 0 ext to demo improvement in functional ROM required for ADLs/IADLs.  -    STG 2 Progress -- New  -    STG 3 -- Pt will improve L  strength by 10# or more to demo improvement in  required for ADLs/IADLs.  -    STG 3 Progress -- New  -    STG 4 -- Pt will dalia 10 mins of ILANA (superficial and/or deep) to decrease pain and prepare pt for therapeutic ex/act.  -    STG 4 Progress -- New  -       Long Term Goals    LTG 1 -- Pt will actively participate in HEPs with emphasis on ROM, strengthening, and/or FMC.  -    LTG 1 Progress -- New  -    LTG 2 -- Pt will decrease overall Quick Dash assessment by 10 points or more to demo improvement in ADLs/IADLs.  -    LTG 2 Progress -- New  -    LTG 3 -- Pt will improve L 9 hole peg test by 5 secs or more to demo improvement in FMC required for  ADLs/IADLs.  -    LTG 3 Progress -- New  -    LTG 4 -- Pt will rate pain at 3/10 or less in L hand.  -    LTG 4 Progress -- New  -       Time Calculation    OT Goal Re-Cert Due Date 06/15/22  - 06/15/22  -          User Key  (r) = Recorded By, (t) = Taken By, (c) = Cosigned By    Initials Name Provider Type     Sindhu Emerson, OT Occupational Therapist                 OT Assessment/Plan     Row Name 05/25/22 1515          OT Assessment    Functional Limitations Decreased safety during functional activities;Limitations in community activities;Performance in self-care ADL;Performance in leisure activities;Limitation in home management  -     Impairments Dexterity;Coordination;Pain;Muscle strength;Range of motion;Sensation  -     Assessment Comments Pt presents with decreased digit ROM, pain, impaired sensation, decreased L  strength, and impaired ADLs/IADLs. Pt would benefit from continued skilled OT services to improve/further address these deficits. Without skilled OT services, pt is at risk for further functional decline.  -     OT Diagnosis Synovitis & Tenosynovitis of L index finger- impaired ADLs/IADLs, decreased ROM, decreased  strength, pain, and impaired sensation.  -     OT Rehab Potential Good  -     Patient/caregiver participated in establishment of treatment plan and goals Yes  -     Patient would benefit from skilled therapy intervention Yes  -            OT Plan    OT Frequency 2x/week  -     Predicted Duration of Therapy Intervention (OT) 6-8wks with further TBD  -     Planned CPT's? OT EVAL MOD COMPLEXITY: 93298;OT RE-EVAL: 84890;OT THER ACT EA 15 MIN: 41403PM;OT THER PROC EA 15 MIN: 58566FI;OT SELF CARE/MGMT/TRAIN 15 MIN: 61466;OT HOT/COLD PACK;OT ULTRASOUND EA 15 MIN: 48255;OT PARAFFIN BATH: 47134BV;OT MANUAL THERAPY EA 15 MIN: 96761;OT THER SUPP EA 15 MIN:  fluidotherapy  -     Planned Therapy Interventions (Optional Details) ROM (Range of  Motion);strengthening;stretching;patient/family education;motor coordination training;manual therapy techniques;home exercise program  -GARRETT     OT Plan Comments Pt would benefit from skilled OT services 2x/wk for 6-8wks with further TBD  -GARRETT           User Key  (r) = Recorded By, (t) = Taken By, (c) = Cosigned By    Initials Name Provider Type    Sindhu Pearson OT Occupational Therapist                       Outcome Measure Options: Quick DASH, 9 Hole Peg  9 Hole Peg  9-Hole Peg Left: 30 secs  9-Hole Peg Right: 23 secs  Quick DASH  Open a tight or new jar.: Mild Difficulty  Do heavy household chores (e.g., wash walls, wash floors): Mild Difficulty  Carry a shopping bag or briefcase: Moderate Difficulty  Wash your back: No Difficulty  Use a knife to cut food: Moderate Difficulty  Recreational activities in which you take some force or impact through your arm, should or hand (e.g. golf, hammering, tennis, etc.): Moderate Difficulty  During the past week, to what extent has your arm, shoulder, or hand problem interfered with your normal social activites with family, friends, neighbors or groups?: Moderately  During the past week, were you limited in your work or other regular daily activities as a result of your arm, shoulder or hand problem?: Slightly Limited  Arm, Shoulder, or hand pain: Severe  Tingling (pins and needles) in your arm, shoulder, or hand: Severe  During the past week, how much difficulty have you had sleeping because of the pain in your arm, shoulder or hand?: Severe Difficulty  Number of Questions Answered: 11  Quick DASH Score: 45.45         Time Calculation:    OT Start Time: 1515  OT Stop Time: 1555  OT Time Calculation (min): 40 min     Therapy Charges for Today     Code Description Service Date Service Provider Modifiers Qty    [id number]  OT EVAL MOD COMPLEXITY 3 5/25/2022 Sindhu Emerson OT GO 1                  Sindhu Emerson OT  5/25/2022

## 2022-06-01 ENCOUNTER — HOSPITAL ENCOUNTER (OUTPATIENT)
Dept: OCCUPATIONAL THERAPY | Facility: HOSPITAL | Age: 57
Discharge: HOME | End: 2022-06-01
Admitting: FAMILY MEDICINE

## 2022-06-01 DIAGNOSIS — Z78.9 IMPAIRED MOBILITY AND ADLS: ICD-10-CM

## 2022-06-01 DIAGNOSIS — M65.9 SYNOVITIS AND TENOSYNOVITIS: Primary | ICD-10-CM

## 2022-06-01 DIAGNOSIS — Z74.09 IMPAIRED MOBILITY AND ADLS: ICD-10-CM

## 2022-06-01 DIAGNOSIS — Z78.9 IMPAIRED INSTRUMENTAL ACTIVITIES OF DAILY LIVING (IADL): ICD-10-CM

## 2022-06-01 PROCEDURE — 97110 THERAPEUTIC EXERCISES: CPT

## 2022-06-01 PROCEDURE — 97035 APP MDLTY 1+ULTRASOUND EA 15: CPT

## 2022-06-01 NOTE — THERAPY TREATMENT NOTE
Outpatient Occupational Therapy Ortho Treatment Note  [facility]     Patient Name: Rubén Maki  : 1965  MRN: [id number]  Today's Date: 2022        Visit Date: 2022  Visits: 2/2  % Improvement: TBD  Re-cert Date: 6/15/2022  MD Appointment: TBD     Therapy Diagnosis: Synovitis & Tenosynovitis of L index finger- impaired ADLs/IADLs, decreased ROM, decreased  strength, pain, and impaired sensation.     Patient Active Problem List   Diagnosis   • SVT (supraventricular tachycardia) (HCC)   • REESE (acute kidney injury) (HCC)   • Thrombocytopenia (HCC)   • Nonalcoholic fatty liver disease   • Back pain   • Depression   • Essential hypertension   • Splenomegaly   • Morbidly obese (HCC)   • Tenosynovitis        Past Medical History:   Diagnosis Date   • Diabetes mellitus (HCC)    • Elevated liver enzymes    • Elevated liver enzymes    • Gall stones    • GERD (gastroesophageal reflux disease)    • GERD (gastroesophageal reflux disease)    • Hypertension    • LINDSEY (nonalcoholic steatohepatitis)    • LINDSEY (nonalcoholic steatohepatitis)    • Thrombocytopenia (HCC)         Past Surgical History:   Procedure Laterality Date   • GALLBLADDER SURGERY     • GASTRIC BYPASS     • SHOULDER SURGERY     • SKIN GRAFT           Visit Dx:    ICD-10-CM ICD-9-CM   1. Synovitis and tenosynovitis  M65.9 727.00   2. Impaired mobility and ADLs  Z74.09 V49.89    Z78.9    3. Impaired instrumental activities of daily living (IADL)  Z78.9 V49.89         Therapy Education  Education Details: red putty  Given: HEP  Program: New  How Provided: Verbal, Demonstration, Written  Provided to: Patient  Level of Understanding: Verbalized, Demonstrated     OT Assessment/Plan     Row Name 22 1255          OT Assessment    Functional Limitations Decreased safety during functional activities;Limitations in community activities;Performance in self-care ADL;Performance in leisure activities;Limitation in home management  -      "Impairments Dexterity;Coordination;Pain;Muscle strength;Range of motion;Sensation  -     Assessment Comments Pt dalia OT tx session well this date. Reports digit feels \"looser\" after modalities and stretch. Continue OT POC.  -     OT Diagnosis Synovitis & Tenosynovitis of L index finger- impaired ADLs/IADLs, decreased ROM, decreased  strength, pain, and impaired sensation.  -     OT Rehab Potential Good  -     Patient/caregiver participated in establishment of treatment plan and goals Yes  -     Patient would benefit from skilled therapy intervention Yes  -KH            OT Plan    OT Frequency 2x/week  -     Predicted Duration of Therapy Intervention (OT) 6-8wks  -     Planned Therapy Interventions (Optional Details) ROM (Range of Motion);strengthening;stretching;patient/family education;motor coordination training;manual therapy techniques;home exercise program  -     OT Plan Comments continue OT POC  -           User Key  (r) = Recorded By, (t) = Taken By, (c) = Cosigned By    Initials Name Provider Type     Sindhu Emerson, OT Occupational Therapist                  OT Goals     Row Name 06/01/22 1255          OT Short Term Goals    STG 1 Pt will improve L index finger flex (AROM) by 10 deg or more of MCP, PIP, and DIP joints to improve IND/safety with ADLs/IADLs.  -     STG 1 Progress Progressing  -     STG 2 Pt will decrease L index finger PIP ext to 20 deg or less lacking from 0 ext to demo improvement in functional ROM required for ADLs/IADLs.  -     STG 2 Progress Progressing  -     STG 3 Pt will improve L  strength by 10# or more to demo improvement in  required for ADLs/IADLs.  -     STG 3 Progress Progressing  -     STG 4 Pt will dalia 10 mins of ILANA (superficial and/or deep) to decrease pain and prepare pt for therapeutic ex/act.  -     STG 4 Progress Progressing  -            Long Term Goals    LTG 1 Pt will actively participate in HEPs with emphasis on ROM, " strengthening, and/or FMC.  -     LTG 1 Progress Ongoing  -     LTG 2 Pt will decrease overall Quick Dash assessment by 10 points or more to demo improvement in ADLs/IADLs.  -     LTG 2 Progress Not Met  -     LTG 3 Pt will improve L 9 hole peg test by 5 secs or more to demo improvement in FMC required for ADLs/IADLs.  -     LTG 3 Progress Not Met  -     LTG 4 Pt will rate pain at 3/10 or less in L hand.  -     LTG 4 Progress Not Met  -            Time Calculation    OT Goal Re-Cert Due Date 06/15/22  -           User Key  (r) = Recorded By, (t) = Taken By, (c) = Cosigned By    Initials Name Provider Type    Sindhu Pearson, ALTHEA Occupational Therapist                 Modalities     Row Name 06/01/22 1255             Precautions    Existing Precautions/Restrictions no known precautions/restrictions  -              Ultrasound 30504    Location L index digit- MCP, PIP  -KH      Duty Cycle 50  -KH      Frequency 3.0 MHz  -      Intensity - Wts/cm 1  -KH      Phonophoresis No  -KH      71562 - OT Ultrasound Minutes 8  -KH            User Key  (r) = Recorded By, (t) = Taken By, (c) = Cosigned By    Initials Name Provider Type    Sindhu Pearson, OT Occupational Therapist               OT Exercises     Row Name 06/01/22 1255             Subjective Comments    Subjective Comments Reports had increased pain with temp drop and when picking up items.  -              Subjective Pain    Able to rate subjective pain? yes  -      Pre-Treatment Pain Level 7  -KH      Post-Treatment Pain Level 7  -KH              Exercise 1    Exercise Name 1 Utilized fluidotherapy to help decrease pain and prepare pt for therapeutic act/ex. Pt dalia well. Skin integrity good pre/post application.  -KH      Time (Minutes) 1 10  -KH              Exercise 2    Exercise Name 2 Completed PROM/stretch of L index finger. (Completed MCP, PIP, and DIP stretch and composite stretch flex and ext). Pt dalia well. Educated pt to  complete at home.  -KH      Time (Seconds) 2 5  each rep  -KH              Exercise 3    Exercise Name 3 Issued red putty HEP to improve L functional  strength. Pt dalia well. Educated pt to use pain as guide. Verbalized understading.  -KH      Sets 3 1  -KH      Reps 3 10  -KH              Exercise 4    Exercise Name 4 Utilized US to decrease inflammation and decrease pain limiting ROM and ADLs/IADLs. No known precautions. See modalities section for details.  -KH      Time (Minutes) 14 8  -KH            User Key  (r) = Recorded By, (t) = Taken By, (c) = Cosigned By    Initials Name Provider Type    Sindhu Pearson OT Occupational Therapist                  Time Calculation:   OT Start Time: 1255  OT Stop Time: 1345  OT Time Calculation (min): 50 min  Timed Charges  62440 - OT Ultrasound Minutes: 8  Total Minutes  Timed Charges Total Minutes: 8   Total Minutes: 8     Therapy Charges for Today     Code Description Service Date Service Provider Modifiers Qty    75514265370  OT ULTRASOUND EA 15 MIN 6/1/2022 Sindhu Emerson OT GO 1    [id number]  OT THER PROC EA 15 MIN 6/1/2022 Sindhu Emerson OT GO 2                    Sindhu Emerson OT  6/1/2022

## 2022-06-07 ENCOUNTER — HOSPITAL ENCOUNTER (OUTPATIENT)
Dept: OCCUPATIONAL THERAPY | Facility: HOSPITAL | Age: 57
Setting detail: THERAPIES SERIES
Discharge: HOME | End: 2022-06-07

## 2022-06-07 DIAGNOSIS — Z78.9 IMPAIRED MOBILITY AND ADLS: ICD-10-CM

## 2022-06-07 DIAGNOSIS — Z78.9 IMPAIRED INSTRUMENTAL ACTIVITIES OF DAILY LIVING (IADL): ICD-10-CM

## 2022-06-07 DIAGNOSIS — Z74.09 IMPAIRED MOBILITY AND ADLS: ICD-10-CM

## 2022-06-07 DIAGNOSIS — M65.9 SYNOVITIS AND TENOSYNOVITIS: Primary | ICD-10-CM

## 2022-06-07 PROCEDURE — 97035 APP MDLTY 1+ULTRASOUND EA 15: CPT

## 2022-06-07 PROCEDURE — 97110 THERAPEUTIC EXERCISES: CPT

## 2022-06-07 NOTE — THERAPY TREATMENT NOTE
Outpatient Occupational Therapy Ortho Treatment Note  [facility]     Patient Name: Rubén Maki  : 1965  MRN: [id number]  Today's Date: 2022        Visit Date: 2022  Visits: 3/3  % Improvement: TBD  Re-cert Date: 6/15/2022  MD Appointment: TBD     Therapy Diagnosis: Synovitis & Tenosynovitis of L index finger- impaired ADLs/IADLs, decreased ROM, decreased  strength, pain, and impaired sensation.     Patient Active Problem List   Diagnosis   • SVT (supraventricular tachycardia) (HCC)   • REESE (acute kidney injury) (HCC)   • Thrombocytopenia (HCC)   • Nonalcoholic fatty liver disease   • Back pain   • Depression   • Essential hypertension   • Splenomegaly   • Morbidly obese (HCC)   • Tenosynovitis        Past Medical History:   Diagnosis Date   • Diabetes mellitus (HCC)    • Elevated liver enzymes    • Elevated liver enzymes    • Gall stones    • GERD (gastroesophageal reflux disease)    • GERD (gastroesophageal reflux disease)    • Hypertension    • LINDSEY (nonalcoholic steatohepatitis)    • LINDSEY (nonalcoholic steatohepatitis)    • Thrombocytopenia (HCC)         Past Surgical History:   Procedure Laterality Date   • GALLBLADDER SURGERY     • GASTRIC BYPASS     • SHOULDER SURGERY     • SKIN GRAFT           Visit Dx:    ICD-10-CM ICD-9-CM   1. Synovitis and tenosynovitis  M65.9 727.00   2. Impaired mobility and ADLs  Z74.09 V49.89    Z78.9    3. Impaired instrumental activities of daily living (IADL)  Z78.9 V49.89           Hand Therapy (last 24 hours)     Hand Eval     Row Name 22 1100             Left Flexion AROM    II- MP AROM 80  -KH      II- PIP AROM 62  -KH      II- DIP AROM 30  -KH      II- KANG Left Flexion AROM 172  -KH            User Key  (r) = Recorded By, (t) = Taken By, (c) = Cosigned By    Initials Name Provider Type    Sindhu Pearson OT Occupational Therapist                    Therapy Education  Education Details: wrist flex/ext stretch  Given: HEP  Program:  New  How Provided: Verbal, Demonstration  Provided to: Patient  Level of Understanding: Verbalized, Demonstrated     OT Assessment/Plan     Row Name 06/07/22 1100          OT Assessment    Functional Limitations Decreased safety during functional activities;Limitations in community activities;Performance in self-care ADL;Performance in leisure activities;Limitation in home management  -     Impairments Dexterity;Coordination;Pain;Muscle strength;Range of motion;Sensation  -     Assessment Comments Pt dalia OT tx session well this date. Good progress towards goals. Continue OT POC.  -     OT Diagnosis Synovitis & Tenosynovitis of L index finger- impaired ADLs/IADLs, decreased ROM, decreased  strength, pain, and impaired sensation.  -     OT Rehab Potential Good  -     Patient/caregiver participated in establishment of treatment plan and goals Yes  -     Patient would benefit from skilled therapy intervention Yes  -            OT Plan    OT Frequency 2x/week  -     Predicted Duration of Therapy Intervention (OT) 6-8wks  -     Planned Therapy Interventions (Optional Details) ROM (Range of Motion);strengthening;stretching;patient/family education;motor coordination training;manual therapy techniques;home exercise program  -     OT Plan Comments continue OT POC  -           User Key  (r) = Recorded By, (t) = Taken By, (c) = Cosigned By    Initials Name Provider Type    Sindhu Pearson, OT Occupational Therapist                  OT Goals     Row Name 06/07/22 1100          OT Short Term Goals    STG 1 Pt will improve L index finger flex (AROM) by 10 deg or more of MCP, PIP, and DIP joints to improve IND/safety with ADLs/IADLs.  -     STG 1 Progress Progressing;Partially Met  MET MCP, PIP  -     STG 2 Pt will decrease L index finger PIP ext to 20 deg or less lacking from 0 ext to demo improvement in functional ROM required for ADLs/IADLs.  -     STG 2 Progress Progressing  -     STG 3 Pt  "will improve L  strength by 10# or more to demo improvement in  required for ADLs/IADLs.  -     STG 3 Progress Progressing  -     STG 4 Pt will dalia 10 mins of ILANA (superficial and/or deep) to decrease pain and prepare pt for therapeutic ex/act.  -     STG 4 Progress Progressing  -            Long Term Goals    LTG 1 Pt will actively participate in HEPs with emphasis on ROM, strengthening, and/or FMC.  -     LTG 1 Progress Ongoing  -     LTG 2 Pt will decrease overall Quick Dash assessment by 10 points or more to demo improvement in ADLs/IADLs.  -     LTG 2 Progress Not Met  -     LTG 3 Pt will improve L 9 hole peg test by 5 secs or more to demo improvement in FMC required for ADLs/IADLs.  -     LTG 3 Progress Not Met  -     LTG 4 Pt will rate pain at 3/10 or less in L hand.  -     LTG 4 Progress Not Met  -            Time Calculation    OT Goal Re-Cert Due Date 06/15/22  -           User Key  (r) = Recorded By, (t) = Taken By, (c) = Cosigned By    Initials Name Provider Type     Sindhu Emerson, OT Occupational Therapist                 Modalities     Row Name 06/07/22 1100             Precautions    Existing Precautions/Restrictions no known precautions/restrictions  -              Ultrasound 23977    Location L index digit- MCP, PIP  -      Duty Cycle 50  -KH      Frequency 3.0 MHz  -      Intensity - Wts/cm 1  -KH      Phonophoresis No  -      66046 - OT Ultrasound Minutes 8  -KH              Parrafin    Paraffin 02474 Location L hand  -      Rx Minutes 10  -KH            User Key  (r) = Recorded By, (t) = Taken By, (c) = Cosigned By    Initials Name Provider Type    Sindhu Pearson, OT Occupational Therapist               OT Exercises     Row Name 06/07/22 1100             Subjective Comments    Subjective Comments Pt reports has been completing HEPs over the weekend. Reports some itching and \"stabbing\" pain last week.  -              Subjective Pain    Able " to rate subjective pain? yes  -KH      Pre-Treatment Pain Level 6  -KH      Post-Treatment Pain Level 4  -KH              Total Minutes    84759 - OT Therapeutic Exercise Minutes 37  -KH              Exercise 1    Exercise Name 1 Utilized paraffin to help decrease pain in L index finger and prepare pt for therapeutic ex/act. Pt dalia well. Skin integrity good pre/post application.  -KH      Time (Minutes) 1 10  -KH              Exercise 2    Exercise Name 2 Completed PROM/stretch of L index finger. (Completed MCP, PIP, and DIP stretch and composite stretch flex and ext). Pt dalia well. Educated pt to complete at home.  -KH      Time (Seconds) 2 5  each rep  -KH              Exercise 3    Exercise Name 3 Pt completed glides to improve functional ROM of L digits. Pt dalia well. Noted improvements this date. Educated to continue at home.  -KH      Sets 3 1  -KH      Reps 3 10  -KH              Exercise 4    Exercise Name 4 Completed scar massage to improve functional ROM, decrease pain and adhesions. Pt dalia well.  -KH              Exercise 5    Exercise Name 5 Utilized 5# digi flex to improve L functional  strength required for ADLs/IADLs. Pt dalia well. VCs for proper technique.  -KH      Sets 5 2  -KH      Reps 5 10  -KH              Exercise 6    Exercise Name 6 Utilized US to decrease inflammation and decrease pain limiting ROM and ADLs/IADLs. No known precautions. See modalities section for details.  -KH      Time (Minutes) 6 8  -KH            User Key  (r) = Recorded By, (t) = Taken By, (c) = Cosigned By    Initials Name Provider Type    Sindhu Pearson OT Occupational Therapist                  Time Calculation:   OT Start Time: 1100  OT Stop Time: 1145  OT Time Calculation (min): 45 min  Timed Charges  77765 - OT Ultrasound Minutes: 8  02424 - OT Therapeutic Exercise Minutes: 37  Total Minutes  Timed Charges Total Minutes: 45   Total Minutes: 45     Therapy Charges for Today     Code Description Service Date  Service Provider Modifiers Qty    89865033030 HC OT THER PROC EA 15 MIN 6/7/2022 Sindhu Emerson, OT GO 2    79938859302 HC OT ULTRASOUND EA 15 MIN 6/7/2022 Sindhu Emerson OT GO 1                    Sindhu Emerson OT  6/7/2022

## 2022-06-09 ENCOUNTER — HOSPITAL ENCOUNTER (OUTPATIENT)
Dept: OCCUPATIONAL THERAPY | Facility: HOSPITAL | Age: 57
Setting detail: THERAPIES SERIES
Discharge: HOME | End: 2022-06-09

## 2022-06-09 DIAGNOSIS — Z74.09 IMPAIRED MOBILITY AND ADLS: ICD-10-CM

## 2022-06-09 DIAGNOSIS — Z78.9 IMPAIRED INSTRUMENTAL ACTIVITIES OF DAILY LIVING (IADL): ICD-10-CM

## 2022-06-09 DIAGNOSIS — Z78.9 IMPAIRED MOBILITY AND ADLS: ICD-10-CM

## 2022-06-09 DIAGNOSIS — M65.9 SYNOVITIS AND TENOSYNOVITIS: Primary | ICD-10-CM

## 2022-06-09 PROCEDURE — 97110 THERAPEUTIC EXERCISES: CPT

## 2022-06-09 PROCEDURE — 97035 APP MDLTY 1+ULTRASOUND EA 15: CPT

## 2022-06-09 NOTE — THERAPY TREATMENT NOTE
Outpatient Occupational Therapy Ortho Treatment Note  [facility]     Patient Name: Rubén Maki  : 1965  MRN: [id number]  Today's Date: 2022        Visit Date: 2022  Visits: 4/4  % Improvement: TBD  Re-cert Date: 6/15/2022  MD Appointment: TBD     Therapy Diagnosis: Synovitis & Tenosynovitis of L index finger- impaired ADLs/IADLs, decreased ROM, decreased  strength, pain, and impaired sensation.     Patient Active Problem List   Diagnosis   • SVT (supraventricular tachycardia) (HCC)   • REESE (acute kidney injury) (HCC)   • Thrombocytopenia (HCC)   • Nonalcoholic fatty liver disease   • Back pain   • Depression   • Essential hypertension   • Splenomegaly   • Morbidly obese (HCC)   • Tenosynovitis        Past Medical History:   Diagnosis Date   • Diabetes mellitus (HCC)    • Elevated liver enzymes    • Elevated liver enzymes    • Gall stones    • GERD (gastroesophageal reflux disease)    • GERD (gastroesophageal reflux disease)    • Hypertension    • LINDSEY (nonalcoholic steatohepatitis)    • LINDSEY (nonalcoholic steatohepatitis)    • Thrombocytopenia (HCC)         Past Surgical History:   Procedure Laterality Date   • GALLBLADDER SURGERY     • GASTRIC BYPASS     • SHOULDER SURGERY     • SKIN GRAFT           Visit Dx:    ICD-10-CM ICD-9-CM   1. Synovitis and tenosynovitis  M65.9 727.00   2. Impaired mobility and ADLs  Z74.09 V49.89    Z78.9    3. Impaired instrumental activities of daily living (IADL)  Z78.9 V49.89            OT Assessment/Plan     Row Name 22 1100          OT Assessment    Functional Limitations Decreased safety during functional activities;Limitations in community activities;Performance in self-care ADL;Performance in leisure activities;Limitation in home management  -     Impairments Dexterity;Coordination;Pain;Muscle strength;Range of motion;Sensation  -     Assessment Comments Pt dalia OT tx session well this date. Good progress. Continue OT POC.  -KH     OT  Diagnosis Synovitis & Tenosynovitis of L index finger- impaired ADLs/IADLs, decreased ROM, decreased  strength, pain, and impaired sensation.  -     OT Rehab Potential Good  -     Patient/caregiver participated in establishment of treatment plan and goals Yes  -     Patient would benefit from skilled therapy intervention Yes  -            OT Plan    OT Frequency 2x/week  -     Predicted Duration of Therapy Intervention (OT) 6-8wks  -     Planned Therapy Interventions (Optional Details) ROM (Range of Motion);strengthening;stretching;patient/family education;motor coordination training;manual therapy techniques;home exercise program  -     OT Plan Comments continue OT POC  -           User Key  (r) = Recorded By, (t) = Taken By, (c) = Cosigned By    Initials Name Provider Type    Sindhu Pearson, OT Occupational Therapist                  OT Goals     Row Name 06/09/22 1100          OT Short Term Goals    STG 1 Pt will improve L index finger flex (AROM) by 10 deg or more of MCP, PIP, and DIP joints to improve IND/safety with ADLs/IADLs.  -     STG 1 Progress Progressing;Partially Met  MET MCP, PIP  -     STG 2 Pt will decrease L index finger PIP ext to 20 deg or less lacking from 0 ext to demo improvement in functional ROM required for ADLs/IADLs.  -     STG 2 Progress Progressing  -     STG 3 Pt will improve L  strength by 10# or more to demo improvement in  required for ADLs/IADLs.  -     STG 3 Progress Progressing  -     STG 4 Pt will dalia 10 mins of ILANA (superficial and/or deep) to decrease pain and prepare pt for therapeutic ex/act.  -     STG 4 Progress Progressing  -            Long Term Goals    LTG 1 Pt will actively participate in HEPs with emphasis on ROM, strengthening, and/or FMC.  -     LTG 1 Progress Ongoing  -     LTG 2 Pt will decrease overall Quick Dash assessment by 10 points or more to demo improvement in ADLs/IADLs.  -     LTG 2 Progress Not Met   -     LTG 3 Pt will improve L 9 hole peg test by 5 secs or more to demo improvement in FMC required for ADLs/IADLs.  -     LTG 3 Progress Not Met  -     LTG 4 Pt will rate pain at 3/10 or less in L hand.  -     LTG 4 Progress Not Met  -            Time Calculation    OT Goal Re-Cert Due Date 06/15/22  -           User Key  (r) = Recorded By, (t) = Taken By, (c) = Cosigned By    Initials Name Provider Type    Sindhu Pearson, ALTHEA Occupational Therapist                 Modalities     Row Name 06/09/22 1100             Precautions    Existing Precautions/Restrictions no known precautions/restrictions  -              Ultrasound 97043    Location L index digit- palmar side  -      Duty Cycle 50  -KH      Frequency 3.0 MHz  -      Intensity - Wts/cm 1  -KH      Phonophoresis No  -      23991 - OT Ultrasound Minutes 10  -KH            User Key  (r) = Recorded By, (t) = Taken By, (c) = Cosigned By    Initials Name Provider Type    Sindhu Pearson, ALTHEA Occupational Therapist               OT Exercises     Row Name 06/09/22 1100             Subjective Comments    Subjective Comments Pt reports has been completing HEPs. Reports heat prior to stretch has helped. Reports some discomfort after HEP.  -              Subjective Pain    Able to rate subjective pain? yes  -      Pre-Treatment Pain Level 4  -      Post-Treatment Pain Level 3  -KH              Exercise 1    Exercise Name 1 Utilized paraffin to help decrease pain in L index finger and prepare pt for therapeutic ex/act. Pt dalia well. Skin integrity good pre/post application.  -      Time (Minutes) 1 10  -KH              Exercise 2    Exercise Name 2 Completed PROM/stretch of L index finger. (Completed MCP, PIP, and DIP stretch and composite stretch flex and ext). Pt dalia well. Educated pt to complete at home.  -              Exercise 3    Exercise Name 3 Pt completed glides to improve functional ROM of L digits. Pt dalia well. Noted  improvements this date. Educated to continue at home.  -      Sets 3 1  -KH      Reps 3 10  -KH              Exercise 4    Exercise Name 4 Completed scar massage to improve functional ROM, decrease pain and adhesions. Pt dalia well.  -KH              Exercise 5    Exercise Name 5 Utilized 5# digi flex to improve L functional  strength required for ADLs/IADLs. Pt dalia well. VCs for proper technique.  -      Sets 5 3  -KH      Reps 5 10  -KH              Exercise 6    Exercise Name 6 Utilized US to decrease inflammation and decrease pain limiting ROM and ADLs/IADLs. No known precautions. See modalities section for details.  -      Time (Minutes) 6 10  -KH            User Key  (r) = Recorded By, (t) = Taken By, (c) = Cosigned By    Initials Name Provider Type    Sindhu Pearson OT Occupational Therapist                Time Calculation:   OT Start Time: 1100  OT Stop Time: 1140  OT Time Calculation (min): 40 min  Timed Charges  20983 - OT Ultrasound Minutes: 10  Total Minutes  Timed Charges Total Minutes: 10   Total Minutes: 10     Therapy Charges for Today     Code Description Service Date Service Provider Modifiers Qty    03158562568 HC OT ULTRASOUND EA 15 MIN 6/9/2022 Sindhu Emerson OT GO 1    52244463739 HC OT THER PROC EA 15 MIN 6/9/2022 Sindhu Emerson OT GO 2                    Sindhu Emerson OT  6/9/2022

## 2022-06-14 ENCOUNTER — HOSPITAL ENCOUNTER (OUTPATIENT)
Dept: OCCUPATIONAL THERAPY | Facility: HOSPITAL | Age: 57
Setting detail: THERAPIES SERIES
Discharge: HOME | End: 2022-06-14

## 2022-06-14 DIAGNOSIS — Z78.9 IMPAIRED MOBILITY AND ADLS: ICD-10-CM

## 2022-06-14 DIAGNOSIS — Z78.9 IMPAIRED INSTRUMENTAL ACTIVITIES OF DAILY LIVING (IADL): ICD-10-CM

## 2022-06-14 DIAGNOSIS — Z74.09 IMPAIRED MOBILITY AND ADLS: ICD-10-CM

## 2022-06-14 DIAGNOSIS — M65.9 SYNOVITIS AND TENOSYNOVITIS: Primary | ICD-10-CM

## 2022-06-14 PROCEDURE — 97018 PARAFFIN BATH THERAPY: CPT

## 2022-06-14 PROCEDURE — 97110 THERAPEUTIC EXERCISES: CPT

## 2022-06-14 PROCEDURE — 97035 APP MDLTY 1+ULTRASOUND EA 15: CPT

## 2022-06-14 NOTE — THERAPY TREATMENT NOTE
Outpatient Occupational Therapy Ortho Treatment Note  [facility]     Patient Name: Rubén Maki  : 1965  MRN: [id number]  Today's Date: 2022        Visit Date: 2022  Visits: 4/4  % Improvement: TBD  Re-cert Date: 6/15/2022  MD Appointment: TBD     Therapy Diagnosis: Synovitis & Tenosynovitis of L index finger- impaired ADLs/IADLs, decreased ROM, decreased  strength, pain, and impaired sensation.     Patient Active Problem List   Diagnosis   • SVT (supraventricular tachycardia) (HCC)   • REESE (acute kidney injury) (HCC)   • Thrombocytopenia (HCC)   • Nonalcoholic fatty liver disease   • Back pain   • Depression   • Essential hypertension   • Splenomegaly   • Morbidly obese (HCC)   • Tenosynovitis        Past Medical History:   Diagnosis Date   • Diabetes mellitus (HCC)    • Elevated liver enzymes    • Elevated liver enzymes    • Gall stones    • GERD (gastroesophageal reflux disease)    • GERD (gastroesophageal reflux disease)    • Hypertension    • LINDSEY (nonalcoholic steatohepatitis)    • LINDSEY (nonalcoholic steatohepatitis)    • Thrombocytopenia (HCC)         Past Surgical History:   Procedure Laterality Date   • GALLBLADDER SURGERY     • GASTRIC BYPASS     • SHOULDER SURGERY     • SKIN GRAFT           Visit Dx:    ICD-10-CM ICD-9-CM   1. Synovitis and tenosynovitis  M65.9 727.00   2. Impaired mobility and ADLs  Z74.09 V49.89    Z78.9    3. Impaired instrumental activities of daily living (IADL)  Z78.9 V49.89         Therapy Education  Education Details: blocking ex  Given: HEP  Program: New  How Provided: Verbal, Demonstration  Provided to: Patient  Level of Understanding: Verbalized, Demonstrated     OT Assessment/Plan     Row Name 22 1015          OT Assessment    Functional Limitations Decreased safety during functional activities;Limitations in community activities;Performance in self-care ADL;Performance in leisure activities;Limitation in home management  -      Impairments Dexterity;Coordination;Pain;Muscle strength;Range of motion;Sensation  -     Assessment Comments Pt dalia OT tx session well this date. Good progress towards goals. Continue OT POC.  -     OT Diagnosis Synovitis & Tenosynovitis of L index finger- impaired ADLs/IADLs, decreased ROM, decreased  strength, pain, and impaired sensation.  -     OT Rehab Potential Good  -     Patient/caregiver participated in establishment of treatment plan and goals Yes  -     Patient would benefit from skilled therapy intervention Yes  -KH            OT Plan    OT Frequency 2x/week  -     Predicted Duration of Therapy Intervention (OT) 6-8wks  -     Planned Therapy Interventions (Optional Details) ROM (Range of Motion);strengthening;stretching;patient/family education;motor coordination training;manual therapy techniques;home exercise program  -     OT Plan Comments continue OT POC  -           User Key  (r) = Recorded By, (t) = Taken By, (c) = Cosigned By    Initials Name Provider Type    Sindhu Pearson, OT Occupational Therapist                  OT Goals     Row Name 06/14/22 1015          OT Short Term Goals    STG 1 Pt will improve L index finger flex (AROM) by 10 deg or more of MCP, PIP, and DIP joints to improve IND/safety with ADLs/IADLs.  -     STG 1 Progress Progressing;Partially Met  MET MCP, PIP  -     STG 2 Pt will decrease L index finger PIP ext to 20 deg or less lacking from 0 ext to demo improvement in functional ROM required for ADLs/IADLs.  -     STG 2 Progress Progressing  -     STG 3 Pt will improve L  strength by 10# or more to demo improvement in  required for ADLs/IADLs.  -     STG 3 Progress Progressing  -     STG 4 Pt will dalia 10 mins of ILANA (superficial and/or deep) to decrease pain and prepare pt for therapeutic ex/act.  -     STG 4 Progress Progressing;Ongoing  -            Long Term Goals    LTG 1 Pt will actively participate in HEPs with emphasis on  ROM, strengthening, and/or FMC.  -     LTG 1 Progress Ongoing  -     LTG 2 Pt will decrease overall Quick Dash assessment by 10 points or more to demo improvement in ADLs/IADLs.  -     LTG 2 Progress Not Met  -     LTG 3 Pt will improve L 9 hole peg test by 5 secs or more to demo improvement in FMC required for ADLs/IADLs.  -     LTG 3 Progress Not Met  -     LTG 4 Pt will rate pain at 3/10 or less in L hand.  -     LTG 4 Progress Met  -            Time Calculation    OT Goal Re-Cert Due Date 06/15/22  -           User Key  (r) = Recorded By, (t) = Taken By, (c) = Cosigned By    Initials Name Provider Type    Sindhu Pearson, ALTHEA Occupational Therapist                 Modalities     Row Name 06/14/22 1015             Precautions    Existing Precautions/Restrictions no known precautions/restrictions  -              Ultrasound 11226    Location L index digit- palmar side  -      Duty Cycle 50  -KH      Frequency 3.0 MHz  -KH      Intensity - Wts/cm 1.1  -KH      Phonophoresis No  -      28227 - OT Ultrasound Minutes 10  -KH              Parrafin    Paraffin 90380 Location L hand  -KH      Rx Minutes 10  -KH            User Key  (r) = Recorded By, (t) = Taken By, (c) = Cosigned By    Initials Name Provider Type    Sindhu Pearson, ALTHEA Occupational Therapist               OT Exercises     Row Name 06/14/22 1015             Subjective Comments    Subjective Comments Pt reports decreased pain this date. Reports has been completing HEPs.  -              Subjective Pain    Able to rate subjective pain? yes  -KH      Pre-Treatment Pain Level 3  -KH      Post-Treatment Pain Level 3  -KH      Subjective Pain Comment L index finger  -              Exercise 1    Exercise Name 1 Utilized paraffin to help decrease pain in L index finger and prepare pt for therapeutic ex/act. Pt dalia well. Skin integrity good pre/post application.  -      Time (Minutes) 1 10  -KH              Exercise 2     Exercise Name 2 Completed PROM/stretch of L index finger. (Completed MCP, PIP, and DIP stretch and composite stretch flex and ext). Pt dalia well. Educated pt to complete at home.  -KH              Exercise 3    Exercise Name 3 Pt completed glides to improve functional ROM of L digits. Pt dalia well. Noted improvements this date. Educated to continue at home.  -KH      Sets 3 1  -KH      Reps 3 10  -KH              Exercise 4    Exercise Name 4 Completed scar massage to improve functional ROM, decrease pain and adhesions. Pt dalia well. Noted improvements this date.  -KH              Exercise 5    Exercise Name 5 Educated pt on blocking ex for L index finger (DIP and PIP) to improve functional ROM required for ADLs/IADls. Pt dalia well. Educated to complete at home.  -      Sets 5 2  -KH      Reps 5 10  -KH              Exercise 6    Exercise Name 6 Utilized US to decrease inflammation and decrease pain limiting ROM and ADLs/IADLs. No known precautions. See modalities section for details.  -      Time (Minutes) 6 10  -KH            User Key  (r) = Recorded By, (t) = Taken By, (c) = Cosigned By    Initials Name Provider Type    Sindhu Pearson OT Occupational Therapist                  Time Calculation:   OT Start Time: 1015  OT Stop Time: 1056  OT Time Calculation (min): 41 min  Timed Charges  52946 - OT Ultrasound Minutes: 10  Total Minutes  Timed Charges Total Minutes: 10   Total Minutes: 10     Therapy Charges for Today     Code Description Service Date Service Provider Modifiers Qty    80157545888  OT ULTRASOUND EA 15 MIN 6/14/2022 Sindhu Emerson OT GO 1    [id number]  OT THER PROC EA 15 MIN 6/14/2022 Sindhu Emerson OT GO 1    [id number]  OT PARAFFIN BATH 6/14/2022 Sindhu Emerson OT GO 1                    Sindhu Emerson OT  6/14/2022

## 2022-06-16 ENCOUNTER — HOSPITAL ENCOUNTER (OUTPATIENT)
Dept: OCCUPATIONAL THERAPY | Facility: HOSPITAL | Age: 57
Setting detail: THERAPIES SERIES
Discharge: HOME | End: 2022-06-16

## 2022-06-16 DIAGNOSIS — Z78.9 IMPAIRED MOBILITY AND ADLS: ICD-10-CM

## 2022-06-16 DIAGNOSIS — M65.9 SYNOVITIS AND TENOSYNOVITIS: Primary | ICD-10-CM

## 2022-06-16 DIAGNOSIS — Z78.9 IMPAIRED INSTRUMENTAL ACTIVITIES OF DAILY LIVING (IADL): ICD-10-CM

## 2022-06-16 DIAGNOSIS — Z74.09 IMPAIRED MOBILITY AND ADLS: ICD-10-CM

## 2022-06-16 PROCEDURE — 97110 THERAPEUTIC EXERCISES: CPT

## 2022-06-16 PROCEDURE — 97035 APP MDLTY 1+ULTRASOUND EA 15: CPT

## 2022-06-16 NOTE — THERAPY PROGRESS REPORT/RE-CERT
"Outpatient Occupational Therapy Ortho Progress Note  [facility]     Patient Name: Rubén Maki  : 1965  MRN: [id number]  Today's Date: 2022        Visit Date: 2022  Visits: 5/5  % Improvement: \"60-70%\"  Re-cert Date: 2022  MD Appointment: TBD     Therapy Diagnosis: Synovitis & Tenosynovitis of L index finger- impaired ADLs/IADLs, decreased ROM, decreased  strength, pain, and impaired sensation.     Patient Active Problem List   Diagnosis   • SVT (supraventricular tachycardia) (HCC)   • REESE (acute kidney injury) (HCC)   • Thrombocytopenia (HCC)   • Nonalcoholic fatty liver disease   • Back pain   • Depression   • Essential hypertension   • Splenomegaly   • Morbidly obese (HCC)   • Tenosynovitis        Past Medical History:   Diagnosis Date   • Diabetes mellitus (HCC)    • Elevated liver enzymes    • Elevated liver enzymes    • Gall stones    • GERD (gastroesophageal reflux disease)    • GERD (gastroesophageal reflux disease)    • Hypertension    • LINDSEY (nonalcoholic steatohepatitis)    • LINDSEY (nonalcoholic steatohepatitis)    • Thrombocytopenia (HCC)         Past Surgical History:   Procedure Laterality Date   • GALLBLADDER SURGERY     • GASTRIC BYPASS     • SHOULDER SURGERY     • SKIN GRAFT           Visit Dx:    ICD-10-CM ICD-9-CM   1. Synovitis and tenosynovitis  M65.9 727.00   2. Impaired mobility and ADLs  Z74.09 V49.89    Z78.9    3. Impaired instrumental activities of daily living (IADL)  Z78.9 V49.89           Hand Therapy (last 24 hours)     Hand Eval     Row Name 22 1100             Left Extension AROM    II- MP AROM 0  -KH      II- PIP AROM 24  lacking from 0  -KH      II- DIP AROM 0  -KH      II- KANG Left Extension AROM 24  -KH              Left Flexion AROM    II- MP AROM 75  -KH      II- PIP AROM 65  -KH      II- DIP AROM 38  -KH      II- KANG Left Flexion AROM 178  -KH               Strength Right    Right  Test 1 65  -KH      Right  Test 2 60  " -      Right  Test 3 55  -KH       Strength Average Right 60  -KH               Strength Left    Left  Test 1 35  -KH      Left  Test 2 40  -KH      Left  Test 3 35  -KH       Strength Average Left 36.67  -            User Key  (r) = Recorded By, (t) = Taken By, (c) = Cosigned By    Initials Name Provider Type    Sindhu Pearson OT Occupational Therapist                    Therapy Education  Education Details: OT POC; green putty  Given: HEP  Program: Reinforced, Progressed  How Provided: Verbal  Provided to: Patient  Level of Understanding: Verbalized     OT Assessment/Plan     Row Name 06/16/22 1100          OT Assessment    Functional Limitations Decreased safety during functional activities;Limitations in community activities;Performance in self-care ADL;Performance in leisure activities;Limitation in home management  -     Impairments Dexterity;Coordination;Pain;Muscle strength;Range of motion;Sensation  -     Assessment Comments Pt dalia OT tx session well this date. Good progress and met most OT goals. Decreased frequency and will see pt for ~2-3 more wks to improve strengthening and continue to improve functional ROM. OT POC updated.  -     OT Diagnosis Synovitis & Tenosynovitis of L index finger- impaired ADLs/IADLs, decreased ROM, decreased  strength, pain, and impaired sensation.  -     OT Rehab Potential Good  -     Patient/caregiver participated in establishment of treatment plan and goals Yes  -     Patient would benefit from skilled therapy intervention Yes  -            OT Plan    OT Frequency 1x/week  -GARRETT     Predicted Duration of Therapy Intervention (OT) 2-3wks with further TBD  -     OT Plan Comments OT POC updated  -           User Key  (r) = Recorded By, (t) = Taken By, (c) = Cosigned By    Initials Name Provider Type    Sindhu Pearson OT Occupational Therapist                  OT Goals     Row Name 06/16/22 1304 06/16/22 1100        OT Short Term Goals    STG 1 -- Pt will improve L index finger flex (AROM) by 10 deg or more of MCP, PIP, and DIP joints to improve IND/safety with ADLs/IADLs.  -    STG 1 Progress -- Met  -    STG 2 -- Pt will decrease L index finger PIP ext to 20 deg or less lacking from 0 ext to demo improvement in functional ROM required for ADLs/IADLs.  -    STG 2 Progress -- Progressing  -    STG 3 -- Pt will improve L  strength by 10# or more to demo improvement in  required for ADLs/IADLs.  -    STG 3 Progress -- Met  revise goal next tx  -    STG 4 -- Pt will dalia 10 mins of ILANA (superficial and/or deep) to decrease pain and prepare pt for therapeutic ex/act.  -    STG 4 Progress -- Ongoing;Met  -       Long Term Goals    LTG 1 -- Pt will actively participate in HEPs with emphasis on ROM, strengthening, and/or FMC.  -    LTG 1 Progress -- Ongoing;Met  -    LTG 2 -- Pt will decrease overall Quick Dash assessment by 10 points or more to demo improvement in ADLs/IADLs.  -    LTG 2 Progress -- Met  -    LTG 3 -- Pt will improve L 9 hole peg test by 5 secs or more to demo improvement in FMC required for ADLs/IADLs.  -    LTG 3 Progress -- Met  -    LTG 4 -- Pt will rate pain at 3/10 or less in L hand.  -    LTG 4 Progress -- Met  -       Time Calculation    OT Goal Re-Cert Due Date 07/07/22  -KH 07/07/22  -          User Key  (r) = Recorded By, (t) = Taken By, (c) = Cosigned By    Initials Name Provider Type    Sindhu Pearson, OT Occupational Therapist                 Modalities     Row Name 06/16/22 1100             Precautions    Existing Precautions/Restrictions no known precautions/restrictions  -              Ultrasound 43925    Location L index digit- palmar side  -      Duty Cycle 50  -KH      Frequency 3.0 MHz  -      Intensity - Wts/cm 1.1  -KH      Phonophoresis No  -      43066 - OT Ultrasound Minutes 10  -KH            User Key  (r) = Recorded By, (t) = Taken By,  (c) = Cosigned By    Initials Name Provider Type    Sindhu Pearson OT Occupational Therapist               OT Exercises     Row Name 06/16/22 1100             Subjective Comments    Subjective Comments Pt reports decreased pain this date. Pt reports he feels he has improved ~60-70% since starting OT.  -              Subjective Pain    Able to rate subjective pain? yes  -      Pre-Treatment Pain Level 2  -      Post-Treatment Pain Level 1  -      Subjective Pain Comment L index finger  -              Exercise 1    Exercise Name 1 Utilized paraffin to help decrease pain in L index finger and prepare pt for therapeutic ex/act. Pt dalia well. Skin integrity good pre/post application.  -      Time (Minutes) 1 10  -KH              Exercise 2    Exercise Name 2 Completed PROM/stretch of L index finger. (Completed MCP, PIP, and DIP stretch and composite stretch flex and ext). Pt dalia well. Educated pt to continue at home.  -              Exercise 3    Exercise Name 3 Completed formal ROM measurements,  strength, 9 hole peg test, and Quick Dash. See other sections for details.  -              Exercise 4    Exercise Name 4 Utilized US to decrease inflammation and decrease pain limiting ROM and ADLs/IADLs. No known precautions. See modalities section for details.  -      Time (Minutes) 14 10  -KH              Exercise 5    Exercise Name 5 Utilized 5# digi flex to improve L functional  strength required for ADLs/IADLs. Pt dalia well. VCs for proper technique.  -      Sets 5 3  -KH      Reps 5 15  -KH            User Key  (r) = Recorded By, (t) = Taken By, (c) = Cosigned By    Initials Name Provider Type    Sindhu Pearson OT Occupational Therapist                  Outcome Measure Options: Quick DASH, 9 Hole Peg  9 Hole Peg  9-Hole Peg Left: 23 secs  Quick DASH  Open a tight or new jar.: Mild Difficulty  Do heavy household chores (e.g., wash walls, wash floors): No Difficulty  Carry a shopping  bag or briefcase: Mild Difficulty  Wash your back: No Difficulty  Use a knife to cut food: Mild Difficulty  Recreational activities in which you take some force or impact through your arm, should or hand (e.g. golf, hammering, tennis, etc.): Mild Difficulty  During the past week, to what extent has your arm, shoulder, or hand problem interfered with your normal social activites with family, friends, neighbors or groups?: Not at all  During the past week, were you limited in your work or other regular daily activities as a result of your arm, shoulder or hand problem?: Not limited at all  Arm, Shoulder, or hand pain: Mild  Tingling (pins and needles) in your arm, shoulder, or hand: Moderate  During the past week, how much difficulty have you had sleeping because of the pain in your arm, shoulder or hand?: No difficulty  Number of Questions Answered: 11  Quick DASH Score: 15.91           Time Calculation:   OT Start Time: 1100  OT Stop Time: 1147  OT Time Calculation (min): 47 min  Timed Charges  18950 - OT Ultrasound Minutes: 10  Total Minutes  Timed Charges Total Minutes: 10   Total Minutes: 10     Therapy Charges for Today     Code Description Service Date Service Provider Modifiers Qty    13091229140  OT ULTRASOUND EA 15 MIN 6/16/2022 Sindhu Emerson OT GO 1    80910359666 HC OT THER PROC EA 15 MIN 6/16/2022 Sindhu Emerson OT GO 2                    Sindhu Emerson OT  6/16/2022

## 2022-06-21 ENCOUNTER — HOSPITAL ENCOUNTER (OUTPATIENT)
Dept: OCCUPATIONAL THERAPY | Facility: HOSPITAL | Age: 57
Setting detail: THERAPIES SERIES
Discharge: HOME | End: 2022-06-21

## 2022-06-21 DIAGNOSIS — Z78.9 IMPAIRED INSTRUMENTAL ACTIVITIES OF DAILY LIVING (IADL): ICD-10-CM

## 2022-06-21 DIAGNOSIS — Z78.9 IMPAIRED MOBILITY AND ADLS: ICD-10-CM

## 2022-06-21 DIAGNOSIS — M65.9 SYNOVITIS AND TENOSYNOVITIS: Primary | ICD-10-CM

## 2022-06-21 DIAGNOSIS — Z74.09 IMPAIRED MOBILITY AND ADLS: ICD-10-CM

## 2022-06-21 PROCEDURE — 97110 THERAPEUTIC EXERCISES: CPT

## 2022-06-21 PROCEDURE — 97035 APP MDLTY 1+ULTRASOUND EA 15: CPT

## 2022-06-21 NOTE — THERAPY TREATMENT NOTE
"Outpatient Occupational Therapy Ortho Treatment Note  [facility]     Patient Name: Rubén Maki  : 1965  MRN: [id number]  Today's Date: 2022        Visit Date: 2022  Visits: 6/6  % Improvement: \"60-70%\"  Re-cert Date: 2022  MD Appointment: TBD     Therapy Diagnosis: Synovitis & Tenosynovitis of L index finger- impaired ADLs/IADLs, decreased ROM, decreased  strength, pain, and impaired sensation.     Patient Active Problem List   Diagnosis   • SVT (supraventricular tachycardia) (HCC)   • REESE (acute kidney injury) (HCC)   • Thrombocytopenia (HCC)   • Nonalcoholic fatty liver disease   • Back pain   • Depression   • Essential hypertension   • Splenomegaly   • Morbidly obese (HCC)   • Tenosynovitis        Past Medical History:   Diagnosis Date   • Diabetes mellitus (HCC)    • Elevated liver enzymes    • Elevated liver enzymes    • Gall stones    • GERD (gastroesophageal reflux disease)    • GERD (gastroesophageal reflux disease)    • Hypertension    • LINDSEY (nonalcoholic steatohepatitis)    • LINDSEY (nonalcoholic steatohepatitis)    • Thrombocytopenia (HCC)         Past Surgical History:   Procedure Laterality Date   • GALLBLADDER SURGERY     • GASTRIC BYPASS     • SHOULDER SURGERY     • SKIN GRAFT           Visit Dx:    ICD-10-CM ICD-9-CM   1. Synovitis and tenosynovitis  M65.9 727.00   2. Impaired mobility and ADLs  Z74.09 V49.89    Z78.9    3. Impaired instrumental activities of daily living (IADL)  Z78.9 V49.89         Therapy Education  Education Details: HEPs  Program: Reinforced  How Provided: Verbal  Provided to: Patient  Level of Understanding: Verbalized, Demonstrated     OT Assessment/Plan     Row Name 22 1015          OT Assessment    Functional Limitations Decreased safety during functional activities;Limitations in community activities;Performance in self-care ADL;Performance in leisure activities;Limitation in home management  -KH     Impairments " Dexterity;Coordination;Pain;Muscle strength;Range of motion;Sensation  -     Assessment Comments Pt dalia OT tx session well this date. Good progress towards goals. Continue OT POC.  -     OT Diagnosis Synovitis & Tenosynovitis of L index finger- impaired ADLs/IADLs, decreased ROM, decreased  strength, pain, and impaired sensation.  -     OT Rehab Potential Good  -     Patient/caregiver participated in establishment of treatment plan and goals Yes  -     Patient would benefit from skilled therapy intervention Yes  -KH            OT Plan    OT Frequency 1x/week  -     Predicted Duration of Therapy Intervention (OT) 2-3wks  -     Planned Therapy Interventions (Optional Details) ROM (Range of Motion);strengthening;stretching;patient/family education;motor coordination training;manual therapy techniques;home exercise program  -     OT Plan Comments continue OT POC  -           User Key  (r) = Recorded By, (t) = Taken By, (c) = Cosigned By    Initials Name Provider Type    Sindhu Pearson, OT Occupational Therapist                  OT Goals     Row Name 06/21/22 1015          OT Short Term Goals    STG 1 Pt will improve L index finger flex (AROM) by 10 deg or more of MCP, PIP, and DIP joints to improve IND/safety with ADLs/IADLs.  -     STG 1 Progress Met  -     STG 2 Pt will decrease L index finger PIP ext to 20 deg or less lacking from 0 ext to demo improvement in functional ROM required for ADLs/IADLs.  -     STG 2 Progress Progressing  -     STG 3 Pt will improve L  strength by 10# or more to demo improvement in  required for ADLs/IADLs.  -     STG 3 Progress Met  revise goal next tx  -     STG 4 Pt will dalia 10 mins of ILANA (superficial and/or deep) to decrease pain and prepare pt for therapeutic ex/act.  -     STG 4 Progress Ongoing;Met  -            Long Term Goals    LTG 1 Pt will actively participate in HEPs with emphasis on ROM, strengthening, and/or FMC.  -      LTG 1 Progress Ongoing;Met  -KH     LTG 2 Pt will decrease overall Quick Dash assessment by 10 points or more to demo improvement in ADLs/IADLs.  -     LTG 2 Progress Met  -KH     LTG 3 Pt will improve L 9 hole peg test by 5 secs or more to demo improvement in FMC required for ADLs/IADLs.  -     LTG 3 Progress Met  -KH     LTG 4 Pt will rate pain at 3/10 or less in L hand.  -     LTG 4 Progress Met  -            Time Calculation    OT Goal Re-Cert Due Date 07/07/22  -KH           User Key  (r) = Recorded By, (t) = Taken By, (c) = Cosigned By    Initials Name Provider Type    Sindhu Pearson, OT Occupational Therapist                 Modalities     Row Name 06/21/22 1015             Ultrasound 97331    Location L index digit- palmar side  -KH      Duty Cycle 50  -KH      Frequency 3.0 MHz  -KH      Intensity - Wts/cm 1.1  -KH      14253 - OT Ultrasound Minutes 8  -KH            User Key  (r) = Recorded By, (t) = Taken By, (c) = Cosigned By    Initials Name Provider Type    Sindhu Pearson, OT Occupational Therapist               OT Exercises     Row Name 06/21/22 1015             Subjective Comments    Subjective Comments Pt reports decreased pain and able to use index finger for shaving and to open hot tub cover snaps.  -KH              Subjective Pain    Able to rate subjective pain? yes  -KH      Pre-Treatment Pain Level 2  -KH      Post-Treatment Pain Level 2  -KH      Subjective Pain Comment L index finger  -KH              Total Minutes    60246 - OT Therapeutic Exercise Minutes 30  -KH              Exercise 1    Exercise Name 1 Utilized paraffin to help decrease pain in L index finger and prepare pt for therapeutic ex/act. Pt dalia well. Skin integrity good pre/post application.  -KH      Time (Minutes) 1 10  -KH              Exercise 2    Exercise Name 2 Completed PROM/stretch of L index finger. (Completed MCP, PIP, and DIP stretch and composite stretch flex and ext). Pt dalia well. Educated pt to  continue at home.  -KH              Exercise 3    Exercise Name 3 Pt completed glides to improve functional ROM of L digits. Pt dalia well. Noted improvements this date. Educated to continue at home.  -KH      Sets 3 1  -KH      Reps 3 10  -KH              Exercise 4    Exercise Name 4 Utilized 7# digi flex to improve L functional  strength required for ADLs/IADLs. Pt dalia increased resistance well. VCs for proper technique. Did have some shaking/fatigue with act.  -KH      Sets 4 2  -KH      Reps 4 10  -KH              Exercise 5    Exercise Name 5 Utilized US to decrease inflammation and decrease pain limiting ROM and ADLs/IADLs. No known precautions. See modalities section for details.  -KH      Time (Minutes) 5 8  -KH            User Key  (r) = Recorded By, (t) = Taken By, (c) = Cosigned By    Initials Name Provider Type    Sindhu Pearson OT Occupational Therapist                  Time Calculation:   OT Start Time: 1012  OT Stop Time: 1050  OT Time Calculation (min): 38 min  Timed Charges  38061 - OT Ultrasound Minutes: 8  88012 - OT Therapeutic Exercise Minutes: 30  Total Minutes  Timed Charges Total Minutes: 38   Total Minutes: 38     Therapy Charges for Today     Code Description Service Date Service Provider Modifiers Qty    21641313312 HC OT THER PROC EA 15 MIN 6/21/2022 Sindhu Emerson OT GO 2    82667440301 HC OT ULTRASOUND EA 15 MIN 6/21/2022 Sindhu Emerson OT GO 1                    Sindhu Emerson OT  6/21/2022

## 2022-06-23 ENCOUNTER — APPOINTMENT (OUTPATIENT)
Dept: OCCUPATIONAL THERAPY | Facility: HOSPITAL | Age: 57
End: 2022-06-23

## 2022-07-05 ENCOUNTER — HOSPITAL ENCOUNTER (OUTPATIENT)
Dept: OCCUPATIONAL THERAPY | Facility: HOSPITAL | Age: 57
Setting detail: THERAPIES SERIES
Discharge: HOME | End: 2022-07-05

## 2022-07-05 DIAGNOSIS — Z78.9 IMPAIRED MOBILITY AND ADLS: ICD-10-CM

## 2022-07-05 DIAGNOSIS — M65.9 SYNOVITIS AND TENOSYNOVITIS: Primary | ICD-10-CM

## 2022-07-05 DIAGNOSIS — Z74.09 IMPAIRED MOBILITY AND ADLS: ICD-10-CM

## 2022-07-05 DIAGNOSIS — Z78.9 IMPAIRED INSTRUMENTAL ACTIVITIES OF DAILY LIVING (IADL): ICD-10-CM

## 2022-07-05 PROCEDURE — 97035 APP MDLTY 1+ULTRASOUND EA 15: CPT

## 2022-07-05 PROCEDURE — 97110 THERAPEUTIC EXERCISES: CPT

## 2022-07-05 NOTE — THERAPY DISCHARGE NOTE
"Outpatient Occupational Therapy Ortho Progress Note/Discharge Summary  [facility]     Patient Name: Rubén Maki  : 1965  MRN: [id number]  Today's Date: 2022        Visit Date: 2022  Visits:   % Improvement: \"70%\"  Re-cert Date:   MD Appointment: TBD     Therapy Diagnosis: Synovitis & Tenosynovitis of L index finger- impaired ADLs/IADLs, decreased ROM, decreased  strength, pain, and impaired sensation.       Patient Active Problem List   Diagnosis   • SVT (supraventricular tachycardia) (HCC)   • REESE (acute kidney injury) (HCC)   • Thrombocytopenia (HCC)   • Nonalcoholic fatty liver disease   • Back pain   • Depression   • Essential hypertension   • Splenomegaly   • Morbidly obese (HCC)   • Tenosynovitis        Past Medical History:   Diagnosis Date   • Diabetes mellitus (HCC)    • Elevated liver enzymes    • Elevated liver enzymes    • Gall stones    • GERD (gastroesophageal reflux disease)    • GERD (gastroesophageal reflux disease)    • Hypertension    • LINDSEY (nonalcoholic steatohepatitis)    • LINDSEY (nonalcoholic steatohepatitis)    • Thrombocytopenia (HCC)         Past Surgical History:   Procedure Laterality Date   • GALLBLADDER SURGERY     • GASTRIC BYPASS     • SHOULDER SURGERY     • SKIN GRAFT           Visit Dx:    ICD-10-CM ICD-9-CM   1. Synovitis and tenosynovitis  M65.9 727.00   2. Impaired mobility and ADLs  Z74.09 V49.89    Z78.9    3. Impaired instrumental activities of daily living (IADL)  Z78.9 V49.89           Hand Therapy (last 24 hours)     Hand Eval     Row Name 22 1015             Left Extension AROM    II- MP AROM 0  -KH      II- PIP AROM 20  20 with AAROM; 30 AROM  -KH      II- DIP AROM 0  -KH      II- KANG Left Extension AROM 20  -KH              Left Flexion AROM    II- MP AROM 75  -KH      II- PIP AROM 90  -KH      II- DIP AROM 40  -KH      II- KANG Left Flexion AROM 205  -KH               Strength Left    Left  Test 1 45  -KH      Left  " Test 2 40  -KH      Left  Test 3 45  -       Strength Average Left 43.33  -            User Key  (r) = Recorded By, (t) = Taken By, (c) = Cosigned By    Initials Name Provider Type    Sindhu Pearson OT Occupational Therapist                    Therapy Education  Education Details: HEPs  Program: Reinforced  How Provided: Verbal  Provided to: Patient  Level of Understanding: Verbalized, Demonstrated     OT Assessment/Plan     Row Name 07/05/22 1015          OT Assessment    Assessment Comments Pt reports he feels he has improved ~70% since starting OT. Feel pt can continue with HEPs at this time. Pt in agreement. Will d/c OT.  -     OT Diagnosis Synovitis & Tenosynovitis of L index finger- impaired ADLs/IADLs, decreased ROM, decreased  strength, pain, and impaired sensation.  -     Patient/caregiver participated in establishment of treatment plan and goals Yes  -     Patient would benefit from skilled therapy intervention No  -            OT Plan    OT Plan Comments D/C OT  -           User Key  (r) = Recorded By, (t) = Taken By, (c) = Cosigned By    Initials Name Provider Type    Sindhu Pearson, ALTHEA Occupational Therapist                  OT Goals     Row Name 07/05/22 1015          OT Short Term Goals    STG 1 Pt will improve L index finger flex (AROM) by 10 deg or more of MCP, PIP, and DIP joints to improve IND/safety with ADLs/IADLs.  -     STG 1 Progress Met  -     STG 2 Pt will decrease L index finger PIP ext to 20 deg or less lacking from 0 ext to demo improvement in functional ROM required for ADLs/IADLs.  -     STG 2 Progress Partially Met  -     STG 3 Pt will improve L  strength by 10# or more to demo improvement in  required for ADLs/IADLs.  -     STG 3 Progress Met  [facility]     STG 4 Pt will dalia 10 mins of ILANA (superficial and/or deep) to decrease pain and prepare pt for therapeutic ex/act.  -     STG 4 Progress Met  [facility]            Long Term Goals    LTG  1 Pt will actively participate in HEPs with emphasis on ROM, strengthening, and/or FMC.  -     LTG 1 Progress Met  -     LTG 2 Pt will decrease overall Quick Dash assessment by 10 points or more to demo improvement in ADLs/IADLs.  -KH     LTG 2 Progress Met  -KH     LTG 3 Pt will improve L 9 hole peg test by 5 secs or more to demo improvement in FMC required for ADLs/IADLs.  -     LTG 3 Progress Met  -     LTG 4 Pt will rate pain at 3/10 or less in L hand.  -     LTG 4 Progress Met  -           User Key  (r) = Recorded By, (t) = Taken By, (c) = Cosigned By    Initials Name Provider Type    Sindhu Pearson, OT Occupational Therapist                 Modalities     Row Name 07/05/22 1015             Precautions    Existing Precautions/Restrictions no known precautions/restrictions  -              Ultrasound 91715    Location L index digit- palmar side  -      Duty Cycle 50  -KH      Frequency 3.0 MHz  -      Intensity - Wts/cm 1.1  -KH      08217 - OT Ultrasound Minutes 8  -KH            User Key  (r) = Recorded By, (t) = Taken By, (c) = Cosigned By    Initials Name Provider Type    Sindhu Pearson, OT Occupational Therapist                 OT Exercises     Row Name 07/05/22 1015             Subjective Comments    Subjective Comments Reports continued improvement and HEPs going well.  -              Subjective Pain    Able to rate subjective pain? yes  -      Pre-Treatment Pain Level 2  -      Post-Treatment Pain Level 1  -      Subjective Pain Comment L index finger  -              Total Minutes    22812 - OT Therapeutic Exercise Minutes 35  -KH              Exercise 1    Exercise Name 1 Completed PROM/stretch of L index finger. (Completed MCP, PIP, and DIP stretch and composite stretch flex and ext). Pt dalia well. Educated pt to continue at home.  -              Exercise 2    Exercise Name 2 Pt completed blocking ex of L index finger PIP & DIP to improve functional ROM required  for ADls/IADLs. Pt dalia well. Educated pt to continue at home.  -KH      Sets 2 1  -KH      Reps 2 10  -KH              Exercise 3    Exercise Name 3 Pt completed glides to improve functional ROM of L digits. Pt dalia well. Noted improvements this date. Educated to continue at home.  -KH      Sets 3 1  -KH      Reps 3 10  -KH              Exercise 4    Exercise Name 4 Utilized 7# digi flex to improve L functional  strength required for ADLs/IADLs. Pt dalia increased resistance well. VCs for proper technique. Did have some shaking/fatigue with act.  -KH      Sets 4 3  -KH      Reps 4 10  -KH              Exercise 5    Exercise Name 5 Utilized US to decrease inflammation and decrease pain limiting ROM and ADLs/IADLs. No known precautions. See modalities section for details.  -      Time (Minutes) 5 8  -KH              Exercise 6    Exercise Name 6 Completed formal ROM measurements. See hand sections for details.  -            User Key  (r) = Recorded By, (t) = Taken By, (c) = Cosigned By    Initials Name Provider Type    Sindhu Pearson OT Occupational Therapist                Time Calculation:   OT Start Time: 1011  OT Stop Time: 1054  OT Time Calculation (min): 43 min  Timed Charges  74214 - OT Ultrasound Minutes: 8  37910 - OT Therapeutic Exercise Minutes: 35  Total Minutes  Timed Charges Total Minutes: 43   Total Minutes: 43     Therapy Charges for Today     Code Description Service Date Service Provider Modifiers Qty    28010041965 HC OT THER PROC EA 15 MIN 7/5/2022 Sindhu Emerson OT GO 2    33766535676 HC OT ULTRASOUND EA 15 MIN 7/5/2022 Sindhu Emerson OT GO 1                OP OT Discharge Summary  Date of Discharge: 07/05/22  Reason for Discharge: All goals achieved, Maximum functional potential achieved  Outcomes Achieved: Able to achieve all goals within established timeline (partially met x1 OT goal; all others met)  Discharge Destination: Home with home program        Sindhu Emerson  OT  7/5/2022

## 2022-07-12 ENCOUNTER — APPOINTMENT (OUTPATIENT)
Dept: OCCUPATIONAL THERAPY | Facility: HOSPITAL | Age: 57
End: 2022-07-12

## 2022-11-06 PROBLEM — I48.92 ATRIAL FLUTTER WITH RAPID VENTRICULAR RESPONSE (HCC): Status: ACTIVE | Noted: 2022-05-08

## 2022-12-28 ENCOUNTER — TRANSCRIBE ORDERS (OUTPATIENT)
Dept: GENERAL RADIOLOGY | Facility: CLINIC | Age: 57
End: 2022-12-28

## 2022-12-28 DIAGNOSIS — M54.50 LOW BACK PAIN, UNSPECIFIED BACK PAIN LATERALITY, UNSPECIFIED CHRONICITY, UNSPECIFIED WHETHER SCIATICA PRESENT: ICD-10-CM

## 2022-12-28 DIAGNOSIS — M54.2 CERVICALGIA: Primary | ICD-10-CM

## 2022-12-28 DIAGNOSIS — M46.1 SACROILIITIS: ICD-10-CM

## 2023-04-01 ENCOUNTER — HOSPITAL ENCOUNTER (EMERGENCY)
Age: 58
Discharge: HOME | End: 2023-04-01
Attending: EMERGENCY MEDICINE
Payer: OTHER GOVERNMENT

## 2023-04-01 ENCOUNTER — APPOINTMENT (OUTPATIENT)
Dept: CT IMAGING | Age: 58
End: 2023-04-01
Payer: OTHER GOVERNMENT

## 2023-04-01 VITALS
HEIGHT: 68 IN | BODY MASS INDEX: 35.77 KG/M2 | DIASTOLIC BLOOD PRESSURE: 90 MMHG | HEART RATE: 84 BPM | RESPIRATION RATE: 18 BRPM | WEIGHT: 236 LBS | SYSTOLIC BLOOD PRESSURE: 160 MMHG | OXYGEN SATURATION: 96 % | TEMPERATURE: 98.3 F
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
## 2023-04-01 DIAGNOSIS — S09.90XA CLOSED HEAD INJURY, INITIAL ENCOUNTER: Primary | ICD-10-CM

## 2023-04-01 PROCEDURE — 99284 EMERGENCY DEPT VISIT MOD MDM: CPT | Performed by: EMERGENCY MEDICINE

## 2023-04-01 PROCEDURE — 70450 CT HEAD/BRAIN W/O DYE: CPT

## 2023-04-01 RX ORDER — OMEPRAZOLE 20 MG/1
20 CAPSULE, DELAYED RELEASE ORAL DAILY
COMMUNITY

## 2023-04-01 RX ORDER — FOLIC ACID 1 MG/1
1 TABLET ORAL DAILY
COMMUNITY

## 2023-04-01 RX ORDER — ATORVASTATIN CALCIUM 40 MG/1
40 TABLET, FILM COATED ORAL DAILY
COMMUNITY

## 2023-04-01 RX ORDER — GLIPIZIDE 5 MG/1
5 TABLET ORAL
COMMUNITY

## 2023-04-01 RX ORDER — DILTIAZEM HYDROCHLORIDE 240 MG/1
240 CAPSULE, COATED, EXTENDED RELEASE ORAL DAILY
COMMUNITY

## 2023-04-01 RX ORDER — METOPROLOL TARTRATE 100 MG/1
100 TABLET ORAL 2 TIMES DAILY
COMMUNITY

## 2023-04-01 ASSESSMENT — LIFESTYLE VARIABLES: HOW MANY STANDARD DRINKS CONTAINING ALCOHOL DO YOU HAVE ON A TYPICAL DAY: PATIENT DOES NOT DRINK

## 2023-04-01 ASSESSMENT — PAIN - FUNCTIONAL ASSESSMENT: PAIN_FUNCTIONAL_ASSESSMENT: NONE - DENIES PAIN

## 2023-04-02 NOTE — ED PROVIDER NOTES
[facility]  eMERGENCY dEPARTMENT eNCOUnter      Pt Name: Marge Zambrano  MRN: [id number]  [place] 1965  Date of evaluation: 4/1/2023  Provider: Solomon Hughes MD    CHIEF COMPLAINT       Chief Complaint   Patient presents with    Head Injury     Pt got hit head in head by volleyball. No LOC. Pt c/o blurry vision. HISTORY OF PRESENT ILLNESS   (Location/Symptom, Timing/Onset,Context/Setting, Quality, Duration, Modifying Factors, Severity)  Note limiting factors. Marge Zambrano is a 56 y.o. male who presents to the emergency department ***     HPI    NursingNotes were reviewed. REVIEW OF SYSTEMS    (2-9 systems for level 4, 10 or more for level 5)     Review of Systems         PAST MEDICALHISTORY     Past Medical History:   Diagnosis Date    Atrial fibrillation ([address]     Diabetes mellitus ([facility] [address]          SURGICAL HISTORY       Past Surgical History:   Procedure Laterality Date    CHOLECYSTECTOMY           CURRENT MEDICATIONS     Previous Medications    ATORVASTATIN (LIPITOR) 40 MG TABLET    Take 40 mg by mouth daily    DILTIAZEM (CARDIZEM CD) 240 MG EXTENDED RELEASE CAPSULE    Take 240 mg by mouth daily    FOLIC ACID (FOLVITE) 1 MG TABLET    Take 1 mg by mouth daily    GLIPIZIDE (GLUCOTROL) 5 MG TABLET    Take 5 mg by mouth 2 times daily (before meals)    METOPROLOL (LOPRESSOR) 100 MG TABLET    Take 100 mg by mouth 2 times daily    OMEPRAZOLE (PRILOSEC) 20 MG DELAYED RELEASE CAPSULE    Take 20 mg by mouth daily       ALLERGIES     Patient has no known allergies. FAMILY HISTORY     History reviewed. No pertinent family history.        SOCIAL HISTORY       Social History     Socioeconomic History    Marital status:      Spouse name: None    Number of children: None    Years of education: None    Highest education level: None   Tobacco Use    Smoking status: Former     Types: Cigarettes    Smokeless tobacco: Former   Substance and Sexual Activity    Alcohol use: Yes       SCREENINGS General: No focal deficit present. Mental Status: He is alert and oriented to person, place, and time. DIAGNOSTIC RESULTS       RADIOLOGY:  Non-plain film images such as CT, Ultrasound and MRI are read by the radiologist. Plain radiographic images are visualized and preliminarily interpreted bythe emergency physician with the below findings:      CT HEAD WO CONTRAST   Final Result   No acute findings in the head/brain. LABS:  Labs Reviewed - No data to display    All other labs were within normal range or not returned as of this dictation. EMERGENCY DEPARTMENT COURSE and DIFFERENTIAL DIAGNOSIS/MDM:   Vitals:    Vitals:    04/01/23 1837 04/01/23 1842   BP:  (!) 147/101   Pulse: 79    Resp: 18    Temp: 98.3 °F (36.8 °C)    TempSrc: Oral    SpO2: 96%    Weight: 236 lb (107 kg)    Height: 5' 8\" (1.727 m)        MDM    I have independently reviewed patient's CT imaging performed here in the ED. No evidence of acute traumatic injury noted. Patient vital signs are stable and he was ambulatory here in the ED. PROCEDURES:  Unless otherwise noted below, none     Procedures    FINAL IMPRESSION      1. Closed head injury, initial encounter    2. Struck by volleyball, initial encounter          DISPOSITION/PLAN   DISPOSITION Decision To Discharge 04/01/2023 07:48:32 PM      PATIENT REFERRED TO:  Imelda Vieira John [id number] [phone]  Schedule an appointment as soon as possible for a visit         (Please note that portions of this note were completed with a voice recognition program.  Efforts were made to edit thedictations but occasionally words are mis-transcribed.)    Almedia Gowers, MD (electronically signed)  Attending Emergency Physician         Almedia Gowers, MD  04/03/23 9469

## 2023-04-12 ENCOUNTER — LAB (OUTPATIENT)
Dept: LAB | Facility: HOSPITAL | Age: 58
End: 2023-04-12
Payer: OTHER GOVERNMENT

## 2023-04-12 ENCOUNTER — TRANSCRIBE ORDERS (OUTPATIENT)
Dept: LAB | Facility: HOSPITAL | Age: 58
End: 2023-04-12
Payer: OTHER GOVERNMENT

## 2023-04-12 DIAGNOSIS — I47.1 PSVT (PAROXYSMAL SUPRAVENTRICULAR TACHYCARDIA): Primary | ICD-10-CM

## 2023-04-12 PROCEDURE — 36415 COLL VENOUS BLD VENIPUNCTURE: CPT | Performed by: INTERNAL MEDICINE

## 2023-04-12 PROCEDURE — 85025 COMPLETE CBC W/AUTO DIFF WBC: CPT | Performed by: INTERNAL MEDICINE

## 2023-04-12 PROCEDURE — 80053 COMPREHEN METABOLIC PANEL: CPT | Performed by: INTERNAL MEDICINE

## 2023-04-13 LAB
ALBUMIN SERPL-MCNC: 4 G/DL (ref 3.5–5.2)
ALBUMIN/GLOB SERPL: 1.6 G/DL
ALP SERPL-CCNC: 111 U/L (ref 39–117)
ALT SERPL W P-5'-P-CCNC: 50 U/L (ref 1–41)
ANION GAP SERPL CALCULATED.3IONS-SCNC: 7.3 MMOL/L (ref 5–15)
AST SERPL-CCNC: 40 U/L (ref 1–40)
BASOPHILS # BLD AUTO: 0.07 10*3/MM3 (ref 0–0.2)
BASOPHILS NFR BLD AUTO: 0.8 % (ref 0–1.5)
BILIRUB SERPL-MCNC: 1.7 MG/DL (ref 0–1.2)
BUN SERPL-MCNC: 14 MG/DL (ref 6–20)
BUN/CREAT SERPL: 11.1 (ref 7–25)
CALCIUM SPEC-SCNC: 9.4 MG/DL (ref 8.6–10.5)
CHLORIDE SERPL-SCNC: 102 MMOL/L (ref 98–107)
CO2 SERPL-SCNC: 26.7 MMOL/L (ref 22–29)
CREAT SERPL-MCNC: 1.26 MG/DL (ref 0.76–1.27)
DEPRECATED RDW RBC AUTO: 41.2 FL (ref 37–54)
EGFRCR SERPLBLD CKD-EPI 2021: 67.4 ML/MIN/1.73
EOSINOPHIL # BLD AUTO: 0.1 10*3/MM3 (ref 0–0.4)
EOSINOPHIL NFR BLD AUTO: 1.1 % (ref 0.3–6.2)
ERYTHROCYTE [DISTWIDTH] IN BLOOD BY AUTOMATED COUNT: 12.9 % (ref 12.3–15.4)
GLOBULIN UR ELPH-MCNC: 2.5 GM/DL
GLUCOSE SERPL-MCNC: 318 MG/DL (ref 65–99)
HCT VFR BLD AUTO: 45.6 % (ref 37.5–51)
HGB BLD-MCNC: 15.7 G/DL (ref 13–17.7)
LYMPHOCYTES # BLD AUTO: 1.42 10*3/MM3 (ref 0.7–3.1)
LYMPHOCYTES NFR BLD AUTO: 15.7 % (ref 19.6–45.3)
MCH RBC QN AUTO: 30.5 PG (ref 26.6–33)
MCHC RBC AUTO-ENTMCNC: 34.4 G/DL (ref 31.5–35.7)
MCV RBC AUTO: 88.5 FL (ref 79–97)
MONOCYTES # BLD AUTO: 0.66 10*3/MM3 (ref 0.1–0.9)
MONOCYTES NFR BLD AUTO: 7.3 % (ref 5–12)
NEUTROPHILS NFR BLD AUTO: 6.79 10*3/MM3 (ref 1.7–7)
NEUTROPHILS NFR BLD AUTO: 74.8 % (ref 42.7–76)
PLATELET # BLD AUTO: 37 10*3/MM3 (ref 140–450)
PMV BLD AUTO: 13.4 FL (ref 6–12)
POTASSIUM SERPL-SCNC: 4.6 MMOL/L (ref 3.5–5.2)
PROT SERPL-MCNC: 6.5 G/DL (ref 6–8.5)
RBC # BLD AUTO: 5.15 10*6/MM3 (ref 4.14–5.8)
SODIUM SERPL-SCNC: 136 MMOL/L (ref 136–145)
WBC NRBC COR # BLD: 9.07 10*3/MM3 (ref 3.4–10.8)